# Patient Record
Sex: FEMALE | Race: WHITE | NOT HISPANIC OR LATINO | Employment: PART TIME
[De-identification: names, ages, dates, MRNs, and addresses within clinical notes are randomized per-mention and may not be internally consistent; named-entity substitution may affect disease eponyms.]

---

## 2017-06-10 ENCOUNTER — HEALTH MAINTENANCE LETTER (OUTPATIENT)
Age: 27
End: 2017-06-10

## 2018-05-11 ENCOUNTER — TRANSFERRED RECORDS (OUTPATIENT)
Dept: HEALTH INFORMATION MANAGEMENT | Facility: CLINIC | Age: 28
End: 2018-05-11

## 2018-06-16 ENCOUNTER — HEALTH MAINTENANCE LETTER (OUTPATIENT)
Age: 28
End: 2018-06-16

## 2018-06-21 ENCOUNTER — OFFICE VISIT (OUTPATIENT)
Dept: OBGYN | Facility: CLINIC | Age: 28
End: 2018-06-21
Payer: COMMERCIAL

## 2018-06-21 VITALS
WEIGHT: 174.3 LBS | OXYGEN SATURATION: 98 % | DIASTOLIC BLOOD PRESSURE: 88 MMHG | BODY MASS INDEX: 29.76 KG/M2 | SYSTOLIC BLOOD PRESSURE: 142 MMHG | HEART RATE: 90 BPM | HEIGHT: 64 IN

## 2018-06-21 DIAGNOSIS — D06.9 SEVERE DYSPLASIA OF CERVIX (CIN III): Primary | ICD-10-CM

## 2018-06-21 DIAGNOSIS — Z87.51 HISTORY OF PRETERM DELIVERY: ICD-10-CM

## 2018-06-21 PROCEDURE — 99203 OFFICE O/P NEW LOW 30 MIN: CPT | Performed by: OBSTETRICS & GYNECOLOGY

## 2018-06-21 NOTE — MR AVS SNAPSHOT
After Visit Summary   6/21/2018    Satinder Avalos    MRN: [id number]           Patient Information     Date Of Birth          1990        Visit Information        Provider Department      6/21/2018 1:00 PM Elle Obregon DO [facility]        Care Instructions                                                         If you have any questions regarding your visit, Please contact your care team.    [facility] CLINIC HOURS TELEPHONE NUMBER   Elle Obregon DO.    CARL Perez -    CARLOS Lockhart       [facility], [facility]  8:30a.m-5:00 p.m   [facility]  [address] [place] [address]  [phone] ask for [facility]    Imaging [phone]       Urgent Care locations:    [facility] Saturday and Sunday   9 am - 5 pm    Monday-Friday   12 pm - 8 pm  Saturday and Sunday   9 am - 5 pm   [phone]     [facility] [place] Labor and Delivery:  [phone]    If you need a medication refill, please contact your pharmacy. Please allow 3 business days for your refill to be completed.  As always, Thank you for trusting us with your healthcare needs!                Follow-ups after your visit        Who to contact     If you have questions or need follow up information about today's clinic visit or your schedule please contact [facility] directly at [phone].  Normal or non-critical lab and imaging results will be communicated to you by MyChart, letter or phone within 4 business days after the clinic has received the results. If you do not hear from us within 7 days, please contact the clinic through MyChart or phone. If you have a critical or abnormal lab result, we will notify you by phone as soon as possible.  Submit refill requests through [employer] or call your pharmacy and they will forward the refill request  "to us. Please allow 3 business days for your refill to be completed.          Additional Information About Your Visit        MyChart Information     [employer] gives you secure access to your electronic health record. If you see a primary care provider, you can also send messages to your care team and make appointments. If you have questions, please call your primary care clinic.  If you do not have a primary care provider, please call [phone] and they will assist you.        Care EveryWhere ID     This is your Care EveryWhere ID. This could be used by other organizations to access your [place] medical records  [id number]        Your Vitals Were     Pulse Height Last Period Pulse Oximetry BMI (Body Mass Index)       90 5' 4.17\" (1.63 m) 06/14/2018 98% 29.76 kg/m2        Blood Pressure from Last 3 Encounters:   06/21/18 142/88   04/23/13 118/62   04/16/13 118/72    Weight from Last 3 Encounters:   06/21/18 174 lb 4.8 oz (79.1 kg)   04/23/13 165 lb (74.8 kg)   04/16/13 163 lb (73.9 kg)              Today, you had the following     No orders found for display       Primary Care Provider Office Phone # Fax #    Shama Antoine [phone]       [facility] [address]        Equal Access to Services     [facility]: Hadii aad ku hadasho Soomaali, waaxda luqadaha, qaybta kaalmada adeegyada, waxay alexis haymena silva. So [facility] [phone].    ATENCIÓN: Si habla español, tiene a baez disposición servicios gratuitos de asistencia lingüística. Llame al [phone].    We comply with applicable federal civil rights laws and [place] laws. We do not discriminate on the basis of race, color, national origin, age, disability, sex, sexual orientation, or gender identity.            Thank you!     Thank you for choosing [facility]  [place] your [place]. Our goal is always to provide you with excellent care. Hearing back from our patients is one way we can " continue to improve our services. Please take a few minutes to complete the written survey that you may receive in the mail after your visit with us. Thank you!             Your Updated Medication List - Protect others around you: Learn how to safely use, store and throw away your medicines at www.disposemymeds.org.          This list is accurate as of 6/21/18  1:10 PM.  Always use your most recent med list.                   Brand Name Dispense Instructions for use Diagnosis    boric acid topical Powd powder      500 mg vaginal capsules,  1 per vagina daily for 7 days every month        clindamycin 300 MG capsule    CLEOCIN    21 capsule    Take 1 capsule by mouth 3 times daily.    Bacterial vaginal infection       norelgestromin-ethinyl estradiol 150-35 MCG/24HR patch    ORTHO EVRA    9 patch    Place 1 patch onto the skin once a week. For 9 weeks, then have menses    Contraception

## 2018-06-21 NOTE — PATIENT INSTRUCTIONS
If you have any questions regarding your visit, Please contact your care team.    [facility] CLINIC HOURS TELEPHONE NUMBER   Elle Obregon DO.    CARL Perez -    CARLOS Lockhart       Monday, Wednesday, Thursday and Friday, [place]  8:30a.m-5:00 p.m   [facility]  [address] [place] [address]  [phone] ask for [facility]    Imaging [phone]       Urgent Care locations:    [facility] Saturday and Sunday   9 am - 5 pm    Monday-Friday   12 pm - 8 pm  Saturday and Sunday   9 am - 5 pm   [phone]     [facility] Labor and Delivery:  [phone]    If you need a medication refill, please contact your pharmacy. Please allow 3 business days for your refill to be completed.  As always, Thank you for trusting us with your healthcare needs!

## 2018-06-22 NOTE — PROGRESS NOTES
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  She states that Dr. Payam Joel at [facility] performed colposcopy on 18 and her pathology report demonstrated ELICEO 3 of the ECC.  However, I am not able to access his exam since [facility] is not available on \"Care Everywhere.\"  Her pap on 18 showed ASCUS changes and a high grade lesion could not be ruled out.  Her HPV test on 3/7/13 was + for high risk subtypes.  She has used the Ortho Evra patch 9 years ago for contraception but currently uses condoms.  She is s/p  delivery at 25 weeks gestation so would like to discuss the possible need for cerclage in future pregnancies.    /88 (Patient Position: Sitting, Cuff Size: Adult Regular)  Pulse 90  Ht 5' 4.17\" (1.63 m)  Wt 174 lb 4.8 oz (79.1 kg)  LMP 2018  SpO2 98%  BMI 29.76 kg/m2  ROS:  10 systems were reviewed and the positives were listed under problems.  A PE was not performed today.  Assessment - ELICEO 3 of the ECC, hx of  delivery at 25 weeks gestation of unknown etiology - possible incompetent cervix  Plan - We discussed the recommendation of a LEEP given her recent diagnosis of a high grade lesion/ELICEO 3 of the endocervix.  However, she is very nervous that this could further damage her cervix and increase her risk of a subsequent  delivery since she would like more children.  Will refer her to [facility] to discuss these concerns.  I would like a copy of her medical records from [facility] or a repeat colposcopy could be performed since there is no mention of the appearance of her ectocervix.  This was a 30-minute visit and over 50% of the time was spent in direct pt consultation.    "

## 2018-06-26 ENCOUNTER — TELEPHONE (OUTPATIENT)
Dept: OBGYN | Facility: CLINIC | Age: 28
End: 2018-06-26

## 2018-06-26 ENCOUNTER — MYC MEDICAL ADVICE (OUTPATIENT)
Dept: OBGYN | Facility: CLINIC | Age: 28
End: 2018-06-26

## 2018-06-26 NOTE — TELEPHONE ENCOUNTER
Reason for call:  Other   Patient called regarding (reason for call): Referral  Additional comments: Patient needs perinatology referral faxed over to clinic again    Phone number to reach patient:  Home number on file [phone] (home)    Best Time:  anytime    Can we leave a detailed message on this number?  NO

## 2018-06-27 ENCOUNTER — TELEPHONE (OUTPATIENT)
Dept: OBGYN | Facility: CLINIC | Age: 28
End: 2018-06-27

## 2018-06-27 NOTE — TELEPHONE ENCOUNTER
I sent patient a my chart message informing that I did go ahead and re-faxed the referral paperwork.  Ana Stringer, [facility]  June 27, 2018 9:17 AM

## 2018-07-05 ENCOUNTER — MYC MEDICAL ADVICE (OUTPATIENT)
Dept: OBGYN | Facility: CLINIC | Age: 28
End: 2018-07-05

## 2018-07-31 ENCOUNTER — TELEPHONE (OUTPATIENT)
Dept: MATERNAL FETAL MEDICINE | Facility: CLINIC | Age: 28
End: 2018-07-31

## 2018-07-31 ENCOUNTER — TELEPHONE (OUTPATIENT)
Dept: OBGYN | Facility: CLINIC | Age: 28
End: 2018-07-31

## 2018-07-31 NOTE — TELEPHONE ENCOUNTER
I called this pt and discussed her situation after meeting with Dr. Puga.  The pt will give this more thought and then call me back at the end of this week with her decision.

## 2018-07-31 NOTE — TELEPHONE ENCOUNTER
27 year old   seen for consultation at [facility] office.  Called Dr. Obregon to discuss patient plan of care.  Message left at her office for her to call me back.          Marry Puga MD  , OB/GYN  Maternal-Fetal Medicine  [email or web address]  [phone] (Academic office)  [phone] (Pager)

## 2018-09-04 ENCOUNTER — TELEPHONE (OUTPATIENT)
Dept: OBGYN | Facility: CLINIC | Age: 28
End: 2018-09-04

## 2018-09-04 NOTE — TELEPHONE ENCOUNTER
[facility] Call Center    Phone Message    May a detailed message be left on voicemail: yes    Reason for Call: Other: Patient is calling to schedule a LEEP procedure. Please advise.      Action Taken: Message routed to:  [facility] p [phone]

## 2018-10-01 ENCOUNTER — OFFICE VISIT (OUTPATIENT)
Dept: OBGYN | Facility: CLINIC | Age: 28
End: 2018-10-01
Payer: COMMERCIAL

## 2018-10-01 VITALS
HEART RATE: 80 BPM | DIASTOLIC BLOOD PRESSURE: 86 MMHG | OXYGEN SATURATION: 98 % | SYSTOLIC BLOOD PRESSURE: 123 MMHG | WEIGHT: 180.1 LBS | BODY MASS INDEX: 30.75 KG/M2

## 2018-10-01 DIAGNOSIS — D06.9 SEVERE DYSPLASIA OF CERVIX (CIN III): Primary | ICD-10-CM

## 2018-10-01 PROCEDURE — 99214 OFFICE O/P EST MOD 30 MIN: CPT | Performed by: OBSTETRICS & GYNECOLOGY

## 2018-10-01 NOTE — PATIENT INSTRUCTIONS
If you have any questions regarding your visit, Please contact your care team.    [facility] CLINIC HOURS TELEPHONE NUMBER   Elle Obregon DO.    CARL Perez -    CARLOS Lockhart       Monday, Wednesday, Thursday and Friday, [place]  8:30a.m-5:00 p.m   [facility]  [address] [place] [address]  [phone] ask for [facility]    Imaging [phone]       Urgent Care locations:    [facility] Saturday and Sunday   9 am - 5 pm    Monday-Friday   12 pm - 8 pm  Saturday and Sunday   9 am - 5 pm   [phone]     [facility] Labor and Delivery:  [phone]    If you need a medication refill, please contact your pharmacy. Please allow 3 business days for your refill to be completed.  As always, Thank you for trusting us with your healthcare needs!

## 2018-10-01 NOTE — PROGRESS NOTES
This 27 y/o female, , presents to re-discuss scheduling a LEEP for treatment of ELICEO 3 of the endocervix.  Her colposcopy was performed by Dr. Payam Joel at [facility] on 18 but she failed to return for the recommended LEEP procedure.  After discussing her concerns for not having this done, she was referred to [facility] to discuss.  Dr. Puga advised that she proceed with the LEEP and then decide once pregnant if she needed a cerclage.  She has a hx of a  delivery at 25 weeks gestation of unknown etiology.  She would like to have more children but not at this time.  /86 (Patient Position: Sitting, Cuff Size: Adult Regular)  Pulse 80  Wt 180 lb 1.6 oz (81.7 kg)  LMP 2018 (Exact Date)  SpO2 98%  BMI 30.75 kg/m2  ROS:  10 systems were reviewed and the positives were listed under problems.  A PE was not performed today.  Assessment - ELICEO 3 of the endocervix  Plan - I am still advising a LEEP cone of her cervix and she is now comfortable with this recommendation after discussing her concerns with Dr. Puga from [facility].  Will have Lucy from scheduling call the patient and make these arrangements.  The patient expects her menses in the next couple of days.  Will then have her return for a preop H&P.  This was a 30-minute visit and over 50% of the time was spent in direct pt consultation.

## 2018-10-01 NOTE — MR AVS SNAPSHOT
After Visit Summary   10/1/2018    Satinder Avalos    MRN: [id number]           Patient Information     Date Of Birth          1990        Visit Information        Provider Department      10/1/2018 1:30 PM Elle Obregon DO [facility]        Care Instructions                                                         If you have any questions regarding your visit, Please contact your care team.    [facility] CLINIC HOURS TELEPHONE NUMBER   Elle Obregon DO.    CARL Perez -    CARLOS Lockhart       [facility], [facility]  8:30a.m-5:00 p.m   [facility]  [address] [place] [address]  [phone] ask for [facility]    Imaging [phone]       Urgent Care locations:    [facility] Saturday and Sunday   9 am - 5 pm    Monday-Friday   12 pm - 8 pm  Saturday and Sunday   9 am - 5 pm   [phone]     [facility] Labor and Delivery:  [phone]    If you need a medication refill, please contact your pharmacy. Please allow 3 business days for your refill to be completed.  As always, Thank you for trusting us with your healthcare needs!                Follow-ups after your visit        Who to contact     If you have questions or need follow up information about today's clinic visit or your schedule please contact [facility] directly at [phone].  Normal or non-critical lab and imaging results will be communicated to you by MyChart, letter or phone within 4 business days after the clinic has received the results. If you do not hear from us within 7 days, please contact the clinic through MyChart or phone. If you have a critical or abnormal lab result, we will notify you by phone as soon as possible.  Submit refill requests through [email or web address] or call your pharmacy and they will forward the refill request  to us. Please allow 3 business days for your refill to be completed.          Additional Information About Your Visit        MyChart Information     [employer] gives you secure access to your electronic health record. If you see a primary care provider, you can also send messages to your care team and make appointments. If you have questions, please call your primary care clinic.  If you do not have a primary care provider, please call [phone] and they will assist you.        Care EveryWhere ID     This is your Care EveryWhere ID. This could be used by other organizations to access your [place] medical records  [id number]        Your Vitals Were     Pulse Last Period Pulse Oximetry BMI (Body Mass Index)          80 09/06/2018 (Exact Date) 98% 30.75 kg/m2         Blood Pressure from Last 3 Encounters:   10/01/18 123/86   06/21/18 142/88   04/23/13 118/62    Weight from Last 3 Encounters:   10/01/18 180 lb 1.6 oz (81.7 kg)   06/21/18 174 lb 4.8 oz (79.1 kg)   04/23/13 165 lb (74.8 kg)              Today, you had the following     No orders found for display       Primary Care Provider Office Phone # Fax #    Shama Antoine [phone]       [facility] [address] [place] [address]        Equal Access to Services     DICK SANTOS : Hadii aad ku hadasho Soomaali, waaxda luqadaha, qaybta kaalmada adeegyada, waxay idiin hayaan sandie albert . So [facility] [phone].    ATENCIÓN: Si habla español, tiene a baez disposición servicios gratuitos de asistencia lingüística. Llame al [phone].    We comply with applicable federal civil rights laws and [place] laws. We do not discriminate on the basis of race, color, national origin, age, disability, sex, sexual orientation, or gender identity.            Thank you!     Thank you for choosing [facility]  for your care. Our goal is always to provide you with excellent care. Hearing back from our patients is one way we can continue  to improve our services. Please take a few minutes to complete the written survey that you may receive in the mail after your visit with us. Thank you!             Your Updated Medication List - Protect others around you: Learn how to safely use, store and throw away your medicines at www.disposemymeds.org.          This list is accurate as of 10/1/18  1:40 PM.  Always use your most recent med list.                   Brand Name Dispense Instructions for use Diagnosis    boric acid topical Powd powder      500 mg vaginal capsules,  1 per vagina daily for 7 days every month        clindamycin 300 MG capsule    CLEOCIN    21 capsule    Take 1 capsule by mouth 3 times daily.    Bacterial vaginal infection       norelgestromin-ethinyl estradiol 150-35 MCG/24HR patch    ORTHO EVRA    9 patch    Place 1 patch onto the skin once a week. For 9 weeks, then have menses    Contraception

## 2018-10-03 ENCOUNTER — TELEPHONE (OUTPATIENT)
Dept: OBGYN | Facility: CLINIC | Age: 28
End: 2018-10-03

## 2018-10-03 NOTE — TELEPHONE ENCOUNTER
Surgery Scheduled.    Date of Surgery 11/13/18 Time of Surgery 11:00 am  Procedure: [facility]/Surgical Facility: [facility]  Surgeon: Dr. Obregon  Type of Anesthesia Anticipated:  MAC  Pre-op: 11/07/18 with Dr. Obregon at  OB  2 week post op: Pt will schedule later with Dr. Obregon at  OB  Pre-certification 10/08/18  Consent signed: NA  Hospital Stay NA       [facility] surgery packet mailed to patient's home address.  Patient instructed NPO 12 hours prior to surgery, arrive 1 hours prior to surgery, must have a .  Patient understood and agrees to the plan.      Surgery Pre-Certification    Medical Record Number: [id number]  Satinder Avalos  YOB: 1990   Phone: [phone] (home)   Primary Provider: Shama Antoine    Reason for Admit:  ELICEO 3    Surgeon: Dr. Obregon  Surgical Procedure: LEEP  ICD-9 Coded: D06.9  Date of Surgery: 11/13/18  Consent signed? N/A      Hospital: [facility]  Outpatient    Requestor:  Jacy Benavides     Location:  [facility]    Lucy Zhu [facility]

## 2018-10-03 NOTE — TELEPHONE ENCOUNTER
Associated Diagnoses      Severe dysplasia of cervix (ELICEO III)  - Primary           Order Questions      Question Answer Comment     Procedure name(s) - multi select LEEP      Reason for procedure ELICEO 3 (severe dysplasia) of the endocervical cells      Surgeon: Mindi      Is this a multi surgeon case? No      Laterality N/A      Request for additional equipment Other (see comments) Colposcope     Anesthesia MAC      Initiate Pre-op orders for above procedure: Yes, as ordered in Epic Additional orders noted there also     Location of Case: [facility]      PA Assistant: No      Operating room  requested: Yes      Urgency of Surgery: Routine      Surgeon Procedure Time (incision to closure) in minutes (per procedure as applicable) 60      Note:  Surgical Case Time Needed (in minutes)     Patient Class (for admit prior to surgery, specify number of days in comments): Same day (surgery center outpatient)      H&P To Be Completed By: Surgeon      Where is the note? In [facility] Note       needed? No      Post-Op Appointment 2 weeks      Vendor Needed? No      Spinal Cord Monitoring? No      Patient has Electronic Implant: No

## 2018-10-09 NOTE — TELEPHONE ENCOUNTER
I spoke with Rich at [facility]   CPT 07530 LEE - NO prior authorization required  $3.00 copay for outpatient procedure  REF#[id number]

## 2018-11-07 ENCOUNTER — OFFICE VISIT (OUTPATIENT)
Dept: OBGYN | Facility: CLINIC | Age: 28
End: 2018-11-07
Payer: COMMERCIAL

## 2018-11-07 VITALS
WEIGHT: 175.4 LBS | HEIGHT: 63 IN | TEMPERATURE: 98.2 F | BODY MASS INDEX: 31.08 KG/M2 | OXYGEN SATURATION: 97 % | DIASTOLIC BLOOD PRESSURE: 86 MMHG | HEART RATE: 98 BPM | SYSTOLIC BLOOD PRESSURE: 120 MMHG

## 2018-11-07 DIAGNOSIS — Z01.818 PREOP GENERAL PHYSICAL EXAM: Primary | ICD-10-CM

## 2018-11-07 DIAGNOSIS — Z23 FLU VACCINE NEED: ICD-10-CM

## 2018-11-07 PROCEDURE — 99214 OFFICE O/P EST MOD 30 MIN: CPT | Performed by: OBSTETRICS & GYNECOLOGY

## 2018-11-07 NOTE — PROGRESS NOTES
[address] [address]  [phone]  Dept: [phone]    PRE-OP EVALUATION:  Today's date: 2018    Satinder Avalos (: 1990) presents for pre-operative evaluation assessment as requested by Dr. Obregon.  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  Will also perform colposcopy prior to the LEEP since her ectocervix has not been viewed per myself.    Proposed Surgery/ Procedure: colposcopy followed by a LEEP  Date of Surgery/ Procedure: 18  Time of Surgery/ Procedure: 11:00  [place] [facility]    Primary Physician: Shama Antoine  Type of Anesthesia Anticipated: Local with MAC    Patient has a Health Care Directive or Living Will:  NO    1. NO - Do you have a history of heart attack, stroke, stent, bypass or surgery on an artery in the head, neck, heart or legs?  2. NO - Do you ever have any pain or discomfort in your chest?  3. NO - Do you have a history of  Heart Failure?  4. NO - Are you troubled by shortness of breath when: walking on the level, up a slight hill or at night?  5. NO - Do you currently have a cold, bronchitis or other respiratory infection?  6. NO - Do you have a cough, shortness of breath or wheezing?  7. NO - Do you sometimes get pains in the calves of your legs when you walk?  8. NO - Do you or anyone in your family have previous history of blood clots?  9. NO - Do you or does anyone in your family have a serious bleeding problem such as prolonged bleeding following surgeries or cuts?  10. NO - Have you ever had problems with anemia or been told to take iron pills?  11. NO - Have you had any abnormal blood loss such as black, tarry or bloody stools, or abnormal vaginal bleeding?  12. NO - Have you ever had a blood transfusion?  13. NO - Have you or any of your relatives ever  had problems with anesthesia?  14. NO - Do you have sleep apnea, excessive snoring or daytime drowsiness?  15. NO - Do you have any prosthetic heart valves?  16. NO - Do you have prosthetic joints?  17. NO - Is there any chance that you may be pregnant?      HPI:     HPI related to upcoming procedure: This 27 y/o female, , LMP 18, using condoms for contraception from now until surgery, has been followed at [facility] for follow up of an ECC which was obtained on 18 but Dr. Payam Joel at [facility].  He had advised a LEEP for treatment but the patient declined since she was concerned that this surgery would weaken her cervix and increase her chance of premature delivery.  She already has had a premature delivery at 25 weeks gestation on 2005 so was concerned that her next pregnancy would be complicated.  I referred her to discuss these concerns with Dr. Puga, [facility], and a LEEP was advised followed by a possible cerclage once the patient conceives.  She was advised to start taking a PNV daily po now and avoid secondhand smoke exposure from her .      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 Cancelled.  13 colposcopy. bx- HPV changes.  Plan-- repeat pap at 6 and 12 months. (due 10/16/13)   Per 10/17/13 my chart, OK to change recommendation per new ASCCP guidelines to pap/HPV due 1 year from colp.  May be done here or at [facility] if patient does not have insurance at that time.         Drug allergy 2012     Priority: Medium     metronidazole         Bacterial vaginosis 2011     Priority: Medium     recurrent       CARDIOVASCULAR SCREENING; LDL GOAL LESS THAN 160 2010     Priority:  "Medium     Chronic left SI joint pain 03/09/2010     Priority: Medium      Past Medical History:   Diagnosis Date     ASCUS with positive high risk HPV 3/7/13    + HR 33/45 and 82     H/O colposcopy with cervical biopsy 4/16/13    HPV changes     PID (acute pelvic inflammatory disease)     age 10     Past Surgical History:   Procedure Laterality Date     NO HISTORY OF SURGERY       No current outpatient prescriptions on file.     OTC products: None, except as noted above    Allergies   Allergen Reactions     Flagyl [Metronidazole Hcl]      Swollen lips and facial itching      Latex Allergy: NO    Social History   Substance Use Topics     Smoking status: Never Smoker     Smokeless tobacco: Never Used     Alcohol use Yes      Comment: couple drinks every couple of weeks     History   Drug Use     Yes     Special: Marijuana       REVIEW OF SYSTEMS:   PSH:  Extraction of 4 wisdom teeth in 2007  PMH:  Negative    EXAM:   /86  Pulse 98  Temp 98.2  F (36.8  C) (Oral)  Ht 5' 3\" (1.6 m)  Wt 175 lb 6.4 oz (79.6 kg)  LMP 11/04/2018  SpO2 97%  Breastfeeding? No  BMI 31.07 kg/m2  Hrt - RRR without murmur  Lungs - CTAB    DIAGNOSTICS:   None  No results for input(s): HGB, PLT, INR, NA, POTASSIUM, CR, A1C in the last 55647 hours.     IMPRESSION:   Reason for surgery/procedure: ELICEO 3 of the endocervix per + ECC on 5/11/18  Diagnosis/reason for consult: Need for LEEP    The proposed surgical procedure is considered LOW risk.    REVISED CARDIAC RISK INDEX  The patient has the following serious cardiovascular risks for perioperative complications such as (MI, PE, VFib and 3  AV Block):  No serious cardiac risks  INTERPRETATION: 0 risks: Class I (very low risk - 0.4% complication rate)    The patient has the following additional risks for perioperative complications:  No identified additional risks      ICD-10-CM    1. Preop general physical exam Z01.818        RECOMMENDATIONS:     Informed consent has been reviewed and " obtained for the above listed surgical procedure.  A colposcopy will be performed first, followed by the LEEP since I have not performed this test to-date.  She is to use foam and condoms or abstinence between now and next Tuesday's surgery to avoid pregnancy.  If her UPT is + preoperative, then the LEEP surgery will be cancelled.  The patient's  smokes 1/4 ppd around the pt and refuses to avoid exposing her so we discussed the risks of second-hand smoke exposure.  She declines a flu vaccine.    She also consents to a blood transfusion if emergently necessary.  This was a 30-minute visit and over 50% of the time was spent in direct pt consultation.    Signed Electronically by: Elle Obregon DO FACOG, FACS    Copy of this evaluation report is provided to requesting physician.    [facility] Preop Guidelines    Revised Cardiac Risk Index

## 2018-11-07 NOTE — MR AVS SNAPSHOT
After Visit Summary   11/7/2018    Satinder Avalos    MRN: [id number]           Patient Information     Date Of Birth          1990        Visit Information        Provider Department      11/7/2018 4:15 PM Elle Obregon DO [facility]        Today's Diagnoses     Preop general physical exam    -  1    Flu vaccine need          Care Instructions                                                         If you have any questions regarding your visit, Please contact your care team.    [facility] Access Services: [phone]      [facility] CLINIC HOURS TELEPHONE NUMBER   Elle Obregon DO.    CARL Perez -    CRALOS Lockhart       Monday, Wednesday, Thursday and Friday, [place]  8:30a.m-5:00 p.m   [facility]  [address] [place] [address]  [phone] ask for [facility]    Imaging [phone]       Urgent Care locations:    [facility] Saturday and Sunday   9 am - 5 pm    Monday-Friday   12 pm - 8 pm  Saturday and Sunday   9 am - 5 pm   [phone]     [facility] Labor and Delivery:  [phone]    If you need a medication refill, please contact your pharmacy. Please allow 3 business days for your refill to be completed.  As always, Thank you for trusting us with your healthcare needs!          Before Your Surgery      Call your surgeon if there is any change in your health. This includes signs of a cold or flu (such as a sore throat, runny nose, cough, rash or fever).    Do not smoke, drink alcohol or take over the counter medicine (unless your surgeon or primary care doctor tells you to) for the 24 hours before and after surgery.    If you take prescribed drugs: Follow your doctor s orders about which medicines to take and which to stop until after surgery.    Eating and drinking prior to surgery: follow the instructions from your  surgeon    Take a shower or bath the night before surgery. Use the soap your surgeon gave you to gently clean your skin. If you do not have soap from your surgeon, use your regular soap. Do not shave or scrub the surgery site.  Wear clean pajamas and have clean sheets on your bed.           Follow-ups after your visit        Your next 10 appointments already scheduled     Nov 13, 2018   Procedure with Elle Obregon,    [facility] (--)    [address] [place] [address] [phone]              Who to contact     If you have questions or need follow up information about today's clinic visit or your schedule please contact [facility] directly at [phone].  Normal or non-critical lab and imaging results will be communicated to you by [employer], letter or phone within 4 business days after the clinic has received the results. If you do not hear from us within 7 days, please contact the clinic through Ar[facility]iance[facility] or phone. If you have a critical or abnormal lab result, we will notify you by phone as soon as possible.  Submit refill requests through [facility] or call your pharmacy and they will forward the refill request to us. Please allow 3 business days for your refill to be completed.          Additional Information About Your Visit        Arr[employer] Information     [facility] gives you secure access to your electronic health record. If you see a primary care provider, you can also send messages to your care team and make appointments. If you have questions, please call your primary care clinic.  If you do not have a primary care provider, please call [phone] and they will assist you.        Care EveryWhere ID     This is your Care EveryWhere ID. This could be used by other organizations to access your [place] medical records  [id number]        Your Vitals Were     Pulse Temperature Height Last Period Pulse Oximetry Breastfeeding?    98 98.2  F (36.8  C) (Oral)  "5' 3\" (1.6 m) 11/04/2018 97% No    BMI (Body Mass Index)                   31.07 kg/m2            Blood Pressure from Last 3 Encounters:   11/07/18 120/86   10/01/18 123/86   06/21/18 142/88    Weight from Last 3 Encounters:   11/07/18 175 lb 6.4 oz (79.6 kg)   11/06/18 180 lb (81.6 kg)   10/01/18 180 lb 1.6 oz (81.7 kg)              Today, you had the following     No orders found for display         Today's Medication Changes          These changes are accurate as of 11/7/18  4:19 PM.  If you have any questions, ask your nurse or doctor.               Stop taking these medicines if you haven't already. Please contact your care team if you have questions.     boric acid topical Powd powder   Stopped by:  Elle Obregon DO           clindamycin 300 MG capsule   Commonly known as:  CLEOCIN   Stopped by:  Elle Obregon DO           norelgestromin-ethinyl estradiol 150-35 MCG/24HR patch   Commonly known as:  ORTHO EVRA   Stopped by:  Elle Obregon DO                    Primary Care Provider Office Phone # Fax #    Shama Antoine [phone]       [facility] [address] [place], [place] [id number]        Equal Access to Services     [facility] AH: Hadii aleida ku hadasho Soomaali, waaxda luqadaha, qaybta kaalmada adeegyada, geremias silva. So [facility] [phone].    ATENCIÓN: Si habla español, tiene a baez disposición servicios gratuitos de asistencia lingüística. Llame al [phone].    We comply with applicable federal civil rights laws and [place] laws. We do not discriminate on the basis of race, color, national origin, age, disability, sex, sexual orientation, or gender identity.            Thank you!     Thank you for choosing [facility]  for your care. Our goal is always to provide you with excellent care. Hearing back from our patients is one way we can continue to improve our services. Please take a few minutes to complete the written " survey that you may receive in the mail after your visit with us. Thank you!             Your Updated Medication List - Protect others around you: Learn how to safely use, store and throw away your medicines at www.disposemymeds.org.      Notice  As of 11/7/2018  4:19 PM    You have not been prescribed any medications.

## 2018-11-07 NOTE — PATIENT INSTRUCTIONS
If you have any questions regarding your visit, Please contact your care team.    [facility]: [phone]      [facility] CLINIC HOURS TELEPHONE NUMBER   DO. Ana Rodríguez CMA Lisa -    CARLOS Lockhart       Monday, Wednesday, Thursday and Friday, [place]  8:30a.m-5:00 p.m   [facility]  [address] [place] [address]  [phone] ask for [facility]    Imaging [phone]       Urgent Care locations:    [facility] Saturday and Sunday   9 am - 5 pm    Monday-Friday   12 pm - 8 pm  Saturday and Sunday   9 am - 5 pm   [phone]     [facility] Labor and Delivery:  [phone]    If you need a medication refill, please contact your pharmacy. Please allow 3 business days for your refill to be completed.  As always, Thank you for trusting us with your healthcare needs!          Before Your Surgery      Call your surgeon if there is any change in your health. This includes signs of a cold or flu (such as a sore throat, runny nose, cough, rash or fever).    Do not smoke, drink alcohol or take over the counter medicine (unless your surgeon or primary care doctor tells you to) for the 24 hours before and after surgery.    If you take prescribed drugs: Follow your doctor s orders about which medicines to take and which to stop until after surgery.    Eating and drinking prior to surgery: follow the instructions from your surgeon    Take a shower or bath the night before surgery. Use the soap your surgeon gave you to gently clean your skin. If you do not have soap from your surgeon, use your regular soap. Do not shave or scrub the surgery site.  Wear clean pajamas and have clean sheets on your bed.

## 2018-11-12 ENCOUNTER — ANESTHESIA EVENT (OUTPATIENT)
Dept: SURGERY | Facility: AMBULATORY SURGERY CENTER | Age: 28
End: 2018-11-12

## 2018-11-13 ENCOUNTER — ANESTHESIA (OUTPATIENT)
Dept: SURGERY | Facility: AMBULATORY SURGERY CENTER | Age: 28
End: 2018-11-13
Payer: COMMERCIAL

## 2018-11-13 ENCOUNTER — HOSPITAL ENCOUNTER (OUTPATIENT)
Facility: AMBULATORY SURGERY CENTER | Age: 28
Discharge: HOME | End: 2018-11-13
Attending: OBSTETRICS & GYNECOLOGY | Admitting: OBSTETRICS & GYNECOLOGY
Payer: COMMERCIAL

## 2018-11-13 VITALS
BODY MASS INDEX: 33.13 KG/M2 | HEIGHT: 62 IN | TEMPERATURE: 97 F | WEIGHT: 180 LBS | DIASTOLIC BLOOD PRESSURE: 62 MMHG | OXYGEN SATURATION: 100 % | RESPIRATION RATE: 16 BRPM | HEART RATE: 59 BPM | SYSTOLIC BLOOD PRESSURE: 125 MMHG

## 2018-11-13 DIAGNOSIS — G89.18 POSTOPERATIVE PAIN: Primary | ICD-10-CM

## 2018-11-13 LAB — BETA HCG QUAL IFA URINE: NEGATIVE

## 2018-11-13 PROCEDURE — 57461 CONZ OF CERVIX W/SCOPE LEEP: CPT

## 2018-11-13 PROCEDURE — 88305 TISSUE EXAM BY PATHOLOGIST: CPT | Performed by: OBSTETRICS & GYNECOLOGY

## 2018-11-13 PROCEDURE — 88307 TISSUE EXAM BY PATHOLOGIST: CPT | Performed by: OBSTETRICS & GYNECOLOGY

## 2018-11-13 PROCEDURE — 84703 CHORIONIC GONADOTROPIN ASSAY: CPT | Performed by: OBSTETRICS & GYNECOLOGY

## 2018-11-13 PROCEDURE — 57461 CONZ OF CERVIX W/SCOPE LEEP: CPT | Performed by: OBSTETRICS & GYNECOLOGY

## 2018-11-13 PROCEDURE — G8918 PT W/O PREOP ORDER IV AB PRO: HCPCS

## 2018-11-13 PROCEDURE — G8907 PT DOC NO EVENTS ON DISCHARG: HCPCS

## 2018-11-13 RX ORDER — GABAPENTIN 300 MG/1
300 CAPSULE ORAL ONCE
Status: COMPLETED | OUTPATIENT
Start: 2018-11-13 | End: 2018-11-13

## 2018-11-13 RX ORDER — SODIUM CHLORIDE, SODIUM LACTATE, POTASSIUM CHLORIDE, CALCIUM CHLORIDE 600; 310; 30; 20 MG/100ML; MG/100ML; MG/100ML; MG/100ML
INJECTION, SOLUTION INTRAVENOUS CONTINUOUS
Status: DISCONTINUED | OUTPATIENT
Start: 2018-11-13 | End: 2018-11-14 | Stop reason: HOSPADM

## 2018-11-13 RX ORDER — OXYCODONE HYDROCHLORIDE 5 MG/1
5 TABLET ORAL EVERY 4 HOURS PRN
Status: DISCONTINUED | OUTPATIENT
Start: 2018-11-13 | End: 2018-11-14 | Stop reason: HOSPADM

## 2018-11-13 RX ORDER — ONDANSETRON 2 MG/ML
4 INJECTION INTRAMUSCULAR; INTRAVENOUS EVERY 30 MIN PRN
Status: DISCONTINUED | OUTPATIENT
Start: 2018-11-13 | End: 2018-11-14 | Stop reason: HOSPADM

## 2018-11-13 RX ORDER — ACETAMINOPHEN 325 MG/1
975 TABLET ORAL ONCE
Status: COMPLETED | OUTPATIENT
Start: 2018-11-13 | End: 2018-11-13

## 2018-11-13 RX ORDER — KETOROLAC TROMETHAMINE 30 MG/ML
INJECTION, SOLUTION INTRAMUSCULAR; INTRAVENOUS PRN
Status: DISCONTINUED | OUTPATIENT
Start: 2018-11-13 | End: 2018-11-13

## 2018-11-13 RX ORDER — NALOXONE HYDROCHLORIDE 0.4 MG/ML
.1-.4 INJECTION, SOLUTION INTRAMUSCULAR; INTRAVENOUS; SUBCUTANEOUS
Status: DISCONTINUED | OUTPATIENT
Start: 2018-11-13 | End: 2018-11-14 | Stop reason: HOSPADM

## 2018-11-13 RX ORDER — SODIUM CHLORIDE, SODIUM LACTATE, POTASSIUM CHLORIDE, CALCIUM CHLORIDE 600; 310; 30; 20 MG/100ML; MG/100ML; MG/100ML; MG/100ML
500 INJECTION, SOLUTION INTRAVENOUS CONTINUOUS
Status: DISCONTINUED | OUTPATIENT
Start: 2018-11-13 | End: 2018-11-14 | Stop reason: HOSPADM

## 2018-11-13 RX ORDER — LIDOCAINE 40 MG/G
CREAM TOPICAL
Status: DISCONTINUED | OUTPATIENT
Start: 2018-11-13 | End: 2018-11-14 | Stop reason: HOSPADM

## 2018-11-13 RX ORDER — DEXAMETHASONE SODIUM PHOSPHATE 4 MG/ML
INJECTION, SOLUTION INTRA-ARTICULAR; INTRALESIONAL; INTRAMUSCULAR; INTRAVENOUS; SOFT TISSUE PRN
Status: DISCONTINUED | OUTPATIENT
Start: 2018-11-13 | End: 2018-11-13

## 2018-11-13 RX ORDER — ACETIC ACID 3 %
LIQUID (ML) MISCELLANEOUS PRN
Status: DISCONTINUED | OUTPATIENT
Start: 2018-11-13 | End: 2018-11-13 | Stop reason: HOSPADM

## 2018-11-13 RX ORDER — FENTANYL CITRATE 50 UG/ML
25-50 INJECTION, SOLUTION INTRAMUSCULAR; INTRAVENOUS
Status: DISCONTINUED | OUTPATIENT
Start: 2018-11-13 | End: 2018-11-14 | Stop reason: HOSPADM

## 2018-11-13 RX ORDER — FENTANYL CITRATE 50 UG/ML
INJECTION, SOLUTION INTRAMUSCULAR; INTRAVENOUS PRN
Status: DISCONTINUED | OUTPATIENT
Start: 2018-11-13 | End: 2018-11-13

## 2018-11-13 RX ORDER — KETOROLAC TROMETHAMINE 30 MG/ML
30 INJECTION, SOLUTION INTRAMUSCULAR; INTRAVENOUS EVERY 6 HOURS PRN
Status: DISCONTINUED | OUTPATIENT
Start: 2018-11-13 | End: 2018-11-14 | Stop reason: HOSPADM

## 2018-11-13 RX ORDER — LIDOCAINE HYDROCHLORIDE 20 MG/ML
INJECTION, SOLUTION INFILTRATION; PERINEURAL PRN
Status: DISCONTINUED | OUTPATIENT
Start: 2018-11-13 | End: 2018-11-13

## 2018-11-13 RX ORDER — MEPERIDINE HYDROCHLORIDE 25 MG/ML
12.5 INJECTION INTRAMUSCULAR; INTRAVENOUS; SUBCUTANEOUS
Status: DISCONTINUED | OUTPATIENT
Start: 2018-11-13 | End: 2018-11-14 | Stop reason: HOSPADM

## 2018-11-13 RX ORDER — PROPOFOL 10 MG/ML
INJECTION, EMULSION INTRAVENOUS CONTINUOUS PRN
Status: DISCONTINUED | OUTPATIENT
Start: 2018-11-13 | End: 2018-11-13

## 2018-11-13 RX ORDER — IBUPROFEN 600 MG/1
600 TABLET, FILM COATED ORAL EVERY 6 HOURS PRN
Qty: 30 TABLET | Refills: 0 | Status: SHIPPED | OUTPATIENT
Start: 2018-11-13 | End: 2019-03-22

## 2018-11-13 RX ORDER — ONDANSETRON 4 MG/1
4 TABLET, ORALLY DISINTEGRATING ORAL EVERY 30 MIN PRN
Status: DISCONTINUED | OUTPATIENT
Start: 2018-11-13 | End: 2018-11-14 | Stop reason: HOSPADM

## 2018-11-13 RX ORDER — ONDANSETRON 2 MG/ML
INJECTION INTRAMUSCULAR; INTRAVENOUS PRN
Status: DISCONTINUED | OUTPATIENT
Start: 2018-11-13 | End: 2018-11-13

## 2018-11-13 RX ORDER — FERRIC SUBSULFATE 0.21 G/G
LIQUID TOPICAL PRN
Status: DISCONTINUED | OUTPATIENT
Start: 2018-11-13 | End: 2018-11-13 | Stop reason: HOSPADM

## 2018-11-13 RX ORDER — KETOROLAC TROMETHAMINE 30 MG/ML
30 INJECTION, SOLUTION INTRAMUSCULAR; INTRAVENOUS ONCE
Status: COMPLETED | OUTPATIENT
Start: 2018-11-13 | End: 2018-11-13

## 2018-11-13 RX ADMIN — SODIUM CHLORIDE, SODIUM LACTATE, POTASSIUM CHLORIDE, CALCIUM CHLORIDE 500 ML: 600; 310; 30; 20 INJECTION, SOLUTION INTRAVENOUS at 10:38

## 2018-11-13 RX ADMIN — LIDOCAINE HYDROCHLORIDE 40 MG: 20 INJECTION, SOLUTION INFILTRATION; PERINEURAL at 11:04

## 2018-11-13 RX ADMIN — KETOROLAC TROMETHAMINE 30 MG: 30 INJECTION, SOLUTION INTRAMUSCULAR; INTRAVENOUS at 10:38

## 2018-11-13 RX ADMIN — FENTANYL CITRATE 50 MCG: 50 INJECTION, SOLUTION INTRAMUSCULAR; INTRAVENOUS at 11:04

## 2018-11-13 RX ADMIN — FENTANYL CITRATE 50 MCG: 50 INJECTION, SOLUTION INTRAMUSCULAR; INTRAVENOUS at 10:58

## 2018-11-13 RX ADMIN — KETOROLAC TROMETHAMINE 30 MG: 30 INJECTION, SOLUTION INTRAMUSCULAR; INTRAVENOUS at 11:33

## 2018-11-13 RX ADMIN — OXYCODONE HYDROCHLORIDE 5 MG: 5 TABLET ORAL at 12:04

## 2018-11-13 RX ADMIN — DEXAMETHASONE SODIUM PHOSPHATE 4 MG: 4 INJECTION, SOLUTION INTRA-ARTICULAR; INTRALESIONAL; INTRAMUSCULAR; INTRAVENOUS; SOFT TISSUE at 11:23

## 2018-11-13 RX ADMIN — PROPOFOL 150 MCG/KG/MIN: 10 INJECTION, EMULSION INTRAVENOUS at 11:04

## 2018-11-13 RX ADMIN — GABAPENTIN 300 MG: 300 CAPSULE ORAL at 10:32

## 2018-11-13 RX ADMIN — ONDANSETRON 4 MG: 2 INJECTION INTRAMUSCULAR; INTRAVENOUS at 11:23

## 2018-11-13 RX ADMIN — ACETAMINOPHEN 975 MG: 325 TABLET ORAL at 10:32

## 2018-11-13 NOTE — ANESTHESIA CARE TRANSFER NOTE
Patient: Satinder Avalos    Procedure(s):  EUA, COLPOSCOPY, LEEP    Diagnosis: ELICEO 3 of the endocervical cells  Diagnosis Additional Information: No value filed.    Anesthesia Type:   MAC     Note:  Airway :Face Mask  Patient transferred to:Phase II  Handoff Report: Identifed the Patient, Identified the Reponsible Provider, Reviewed the pertinent medical history, Discussed the surgical course, Reviewed Intra-OP anesthesia mangement and issues during anesthesia, Set expectations for post-procedure period and Allowed opportunity for questions and acknowledgement of understanding      Vitals: (Last set prior to Anesthesia Care Transfer)    CRNA VITALS  11/13/2018 1112 - 11/13/2018 1147      11/13/2018             Pulse: 77    SpO2: 94 %                Electronically Signed By: MAGDALENA Soares CRNA  November 13, 2018  11:47 AM

## 2018-11-13 NOTE — DISCHARGE SUMMARY
Physician Discharge Summary     Patient ID:  Satinder Avalos  [id number]  28 year old  1990    Admit date: 11/13/2018    Discharge date and time: 11/13/2018     Admitting Physician: Elle Obregon DO     Discharge Physician: Same    Admission Diagnoses: ELICEO 3 of the endocervical cells    Discharge Diagnoses: Same    Admission Condition: good    Discharged Condition: good    Indication for Admission: Not applicable    Hospital Course: Not applicable    Consults: none    Significant Diagnostic Studies: none    Treatments: surgery: Exam under anesthesia, colposcopy, LEEP, and endocervical curettings    Discharge Exam: Normal postop exam    Disposition: home    Patient Instructions:   Patient's Medications   New Prescriptions    IBUPROFEN (ADVIL/MOTRIN) 600 MG TABLET    Take 1 tablet (600 mg) by mouth every 6 hours as needed for moderate pain   Previous Medications    No medications on file   Modified Medications    No medications on file   Discontinued Medications    No medications on file     Activity: Nothing per vagina x 2 weeks until checked in the clinic including no intercourse, douching, or tampon use.  Also, no swimming or tub baths but can shower at any time.  Diet: regular diet  Wound Care: keep wound clean and dry    Follow-up with Dr. Obregon in 2 weeks or earlier prn.    Signed:  Elle Obregon DO  FACOG, FACS  11/13/2018  11:39 AM

## 2018-11-13 NOTE — IP AVS SNAPSHOT
[facility]    [address] [place] [address]    Phone:  [phone]                                       After Visit Summary   11/13/2018    Satinder Avalos    MRN: [id number]           After Visit Summary Signature Page     I have received my discharge instructions, and my questions have been answered. I have discussed any challenges I see with this plan with the nurse or doctor.    ..........................................................................................................................................  Patient/Patient Representative Signature      ..........................................................................................................................................  Patient Representative Print Name and Relationship to Patient    ..................................................               ................................................  Date                                   Time    ..........................................................................................................................................  Reviewed by Signature/Title    ...................................................              ..............................................  Date                                               Time          22EPIC Rev 08/18

## 2018-11-13 NOTE — ANESTHESIA PREPROCEDURE EVALUATION
Anesthesia Pre-Procedure Evaluation    Patient: Satinder Avalos   MRN:     [id number] Gender:   female   Age:    28 year old :      1990        Preoperative Diagnosis: ELICEO 3 of the endocervical cells   Procedure(s):  LEEP     Past Medical History:   Diagnosis Date     ASCUS with positive high risk HPV 3/7/13    + HR 33/45 and 82     H/O colposcopy with cervical biopsy 13    HPV changes     PID (acute pelvic inflammatory disease)     age 10      Past Surgical History:   Procedure Laterality Date     NO HISTORY OF SURGERY            Anesthesia Evaluation     .             ROS/MED HX    ENT/Pulmonary:  - neg pulmonary ROS     Neurologic:  - neg neurologic ROS     Cardiovascular:  - neg cardiovascular ROS       METS/Exercise Tolerance:     Hematologic:  - neg hematologic  ROS       Musculoskeletal:  - neg musculoskeletal ROS       GI/Hepatic:  - neg GI/hepatic ROS       Renal/Genitourinary:  - ROS Renal section negative       Endo:  - neg endo ROS   (+) Obesity, .      Psychiatric:  - neg psychiatric ROS       Infectious Disease:  - neg infectious disease ROS       Malignancy:      - no malignancy   Other:    - neg other ROS                     PHYSICAL EXAM:   Mental Status/Neuro: A/A/O   Airway: Facies: Feasible  Mallampati: II  Mouth/Opening: Full  TM distance: > 6 cm  Neck ROM: Full   Respiratory: Auscultation: CTAB     Resp. Rate: Normal     Resp. Effort: Normal      CV: Rhythm: Regular  Rate: Age appropriate  Heart: Normal Sounds   Comments:      Dental: Normal                  Lab Results   Component Value Date    WBC 5.3 2009    HGB 15.1 2009    HCT 43.9 2009     2009     2009    POTASSIUM 3.8 2009    CHLORIDE 104 2009    CO2 27 2009    BUN 6 2009    CR 0.60 2009    GLC 90 2013    SHANNON 9.3 2009    MAG 7.5 (HH) 2005    ALBUMIN 4.2 2009    PROTTOTAL 8.4 2009    ALT 27 2009    AST 36  "06/14/2009    ALKPHOS 87 06/14/2009    BILITOTAL 0.5 06/14/2009    INR 0.94 05/05/2005    FIBR 738 (H) 05/05/2005    TSH 1.43 03/07/2013    HCG Negative 04/16/2013    HCGS Negative 11/10/2007       Preop Vitals  BP Readings from Last 3 Encounters:   11/13/18 (!) 133/92   11/07/18 120/86   10/01/18 123/86    Pulse Readings from Last 3 Encounters:   11/13/18 95   11/07/18 98   10/01/18 80      Resp Readings from Last 3 Encounters:   11/13/18 16   04/23/13 20   04/16/13 16    SpO2 Readings from Last 3 Encounters:   11/13/18 98%   11/07/18 97%   10/01/18 98%      Temp Readings from Last 1 Encounters:   11/13/18 36.8  C (98.2  F) (Tympanic)    Ht Readings from Last 1 Encounters:   11/06/18 1.575 m (5' 2\")      Wt Readings from Last 1 Encounters:   11/06/18 81.6 kg (180 lb)    Estimated body mass index is 32.92 kg/(m^2) as calculated from the following:    Height as of this encounter: 1.575 m (5' 2\").    Weight as of this encounter: 81.6 kg (180 lb).     LDA:  Peripheral IV 11/13/18 Left  (Active)   Site Assessment WDL 11/13/2018 10:31 AM   Line Status Infusing 11/13/2018 10:31 AM   Phlebitis Scale 0-->no symptoms 11/13/2018 10:31 AM   Dressing Intervention New dressing  11/13/2018 10:31 AM   Number of days:0            Assessment:   ASA SCORE: 2    NPO Status: > 6 hours since completed Solid Foods   Documentation: H&P complete; Preop Testing complete; Consents complete   Proceeding: Proceed without further delay  Tobacco Use:  NO Active use of Tobacco/UNKNOWN Tobacco use status     Plan:   Anes. Type:  MAC   Pre-Induction: Midazolam IV   Induction:  IV (Standard)   Airway: Native Airway   Access/Monitoring: PIV   Maintenance: Propofol; IV   Emergence: Procedure Site   Logistics: Same Day Surgery     Postop Pain/Sedation Strategy:  Standard-Options: Opioids PRN     PONV Management:  Adult Risk Factors: Female, Non-Smoker, Postop Opioids  Prevention: Propofol Infusion; Ondansetron     CONSENT: Direct conversation   Plan " and risks discussed with: Patient   Blood Products: Consent Deferred (Minimal Blood Loss)                         Ravi Conn MD

## 2018-11-13 NOTE — IP AVS SNAPSHOT
MRN:[id number]                      After Visit Summary   11/13/2018    Satinder Avalos    MRN: [id number]           Thank you!     Thank you for choosing [facility] for your care. Our goal is always to provide you with excellent care. Hearing back from our patients is one way we can continue to improve our services. Please take a few minutes to complete the written survey that you may receive in the mail after you visit with us. Thank you!        Patient Information     Date Of Birth          1990        About your hospital stay     You were admitted on:  November 13, 2018 You last received care in the:  [facility]    You were discharged on:  November 13, 2018       Who to Call     For medical emergencies, please call 911.  For non-urgent questions about your medical care, please call your primary care provider or clinic, [phone]  For questions related to your surgery, please call your surgery clinic        Attending Provider     Provider Elle Schwartz,  OB/Gyn       Primary Care Provider Office Phone # Fax #    Shama Antoine [phone]      Further instructions from your care team       [facility]  Same-Day Surgery   Adult Discharge Orders & Instructions   For 24 hours after surgery  1. Get plenty of rest.  A responsible adult must stay with you for at least 24 hours after you leave the hospital.   2. Do not drive or use heavy equipment.  If you have weakness or tingling, don't drive or use heavy equipment until this feeling goes away.  3. Do not drink alcohol.  4. Avoid strenuous or risky activities.  Ask for help when climbing stairs.   5. You may feel lightheaded.  IF so, sit for a few minutes before standing.  Have someone help you get up.   6. If you have nausea (feel sick to your stomach): Drink only clear liquids such as apple juice, ginger ale, broth or 7-Up.  Rest may also help.  Be  "sure to drink enough fluids.  Move to a regular diet as you feel able.  7. You may have a slight fever. Call the doctor if your fever is over 100 F (37.7 C) (taken under the tongue) or lasts longer than 24 hours.  8. You may have a dry mouth, a sore throat, muscle aches or trouble sleeping.  These should go away after 24 hours.  9. Do not make important or legal decisions.        10.  Nothing per vagina x 2 weeks including no intercourse, douching, tampons, swimming, or tub baths.  However, you may shower at any time.         11.  Go to the emergency room if you experience heavy vaginal bleeding which is defined as soaking a maxi pad every hour or less.    Call your doctor for any of the followin.  Signs of infection (fever, growing tenderness at the surgery site, a large amount of drainage or bleeding, severe pain, foul-smelling drainage, redness, swelling).    2. It has been over 8 to 10 hours since surgery and you are still not able to urinate (pass water).    3.  Headache for over 24 hours.    Tylenol given 975 mg at 1030        Pending Results     No orders found from 2018 to 2018.            Admission Information     Date & Time Provider Department Dept. Phone    2018 Elle Obregon, [facility] [phone]      Your Vitals Were     Blood Pressure Pulse Temperature Respirations Height Weight    129/72 77 96.6  F (35.9  C) (Tympanic) 16 1.575 m (5' 2\") 81.6 kg (180 lb)    Last Period Pulse Oximetry BMI (Body Mass Index)             2018 100% 32.92 kg/m2         MyChart Information     [employer] gives you secure access to your electronic health record. If you see a primary care provider, you can also send messages to your care team and make appointments. If you have questions, please call your primary care clinic.  If you do not have a primary care provider, please call [phone] and they will assist you.        Care EveryWhere ID     This is your Care " EveryWhere ID. This could be used by other organizations to access your [facility] medical records  [id number]        Equal Access to Services     DICK SANTOS : Hattie Lewis, wajeannieda lumaileadaha, qalouieta kainada sandiejolantalea, waxdilip alexis roxannenelson briceñoandres isakana m roosevelt silva. So [facility] [phone].    ATENCIÓN: Si habla español, tiene a baez disposición servicios gratuitos de asistencia lingüística. Llame al [phone].    We comply with applicable federal civil rights laws and [place] laws. We do not discriminate on the basis of race, color, national origin, age, disability, sex, sexual orientation, or gender identity.               Review of your medicines      START taking        Dose / Directions    ibuprofen 600 MG tablet   Commonly known as:  ADVIL/MOTRIN   Used for:  Postoperative pain        Dose:  600 mg   Take 1 tablet (600 mg) by mouth every 6 hours as needed for moderate pain   Quantity:  30 tablet   Refills:  0            Where to get your medicines      Some of these will need a paper prescription and others can be bought over the counter. Ask your nurse if you have questions.     Bring a paper prescription for each of these medications     ibuprofen 600 MG tablet                Protect others around you: Learn how to safely use, store and throw away your medicines at www.disposemymeds.org.             Medication List: This is a list of all your medications and when to take them. Check marks below indicate your daily home schedule. Keep this list as a reference.      Medications           Morning Afternoon Evening Bedtime As Needed    ibuprofen 600 MG tablet   Commonly known as:  ADVIL/MOTRIN   Take 1 tablet (600 mg) by mouth every 6 hours as needed for moderate pain

## 2018-11-13 NOTE — DISCHARGE INSTRUCTIONS
[facility]  Same-Day Surgery   Adult Discharge Orders & Instructions   For 24 hours after surgery  1. Get plenty of rest.  A responsible adult must stay with you for at least 24 hours after you leave the hospital.   2. Do not drive or use heavy equipment.  If you have weakness or tingling, don't drive or use heavy equipment until this feeling goes away.  3. Do not drink alcohol.  4. Avoid strenuous or risky activities.  Ask for help when climbing stairs.   5. You may feel lightheaded.  IF so, sit for a few minutes before standing.  Have someone help you get up.   6. If you have nausea (feel sick to your stomach): Drink only clear liquids such as apple juice, ginger ale, broth or 7-Up.  Rest may also help.  Be sure to drink enough fluids.  Move to a regular diet as you feel able.  7. You may have a slight fever. Call the doctor if your fever is over 100 F (37.7 C) (taken under the tongue) or lasts longer than 24 hours.  8. You may have a dry mouth, a sore throat, muscle aches or trouble sleeping.  These should go away after 24 hours.  9. Do not make important or legal decisions.        10.  Nothing per vagina x 2 weeks including no intercourse, douching, tampons, swimming, or tub baths.  However, you may shower at any time.         11.  Go to the emergency room if you experience heavy vaginal bleeding which is defined as soaking a maxi pad every hour or less.    Call your doctor for any of the followin.  Signs of infection (fever, growing tenderness at the surgery site, a large amount of drainage or bleeding, severe pain, foul-smelling drainage, redness, swelling).    2. It has been over 8 to 10 hours since surgery and you are still not able to urinate (pass water).    3.  Headache for over 24 hours.    Tylenol given 975 mg at 1030

## 2018-11-13 NOTE — OP NOTE
"Preop diagnosis:  ELICEO 3 of the endocervical canal  Postop diagnosis:  Same  Surgery:  Exam under anesthesia, colposcopy exam, LEEP, and endocervical curettings  Surgeon:  Elle Obregon DO  Assist:  [facility] staff  Anesthesia:  MAC  Complications:  None  Pathology:  Endocervical curettings                     LEEP cone opened at the 3 o'clock position  EBL:  5 ccs  Grafts/implants:  None  Drains:  None  Counts:  Correct    This 29 y/o female, , LMP 18, has been followed at [facility] for the c/o an abnormal pathology report of her endocervix which demonstrated ELICEO 3 (severe dysplasia) on 18 per Dr. Joel ([facility]).  A LEEP was advised but she initially declined since she wanted to conceive.  However, after meeting with Dr. Puga ([facility]), surgical treatment was advised prior to pregnancy and she scheduled for the LEEP.  Informed consent was reviewed and obtained for the listed procedures, and for a blood transfusion if emergently necessary.    She was taken to the OR and was provided MAC.  An exam under anesthesia was performed and no adnexal mass was palpated.  Her uterus was noted to be anteverted and parous in size with normal mobility.  She was then prepped and draped in sterile manner and the \"pause for the cause\" identified the correct patient and procedures.  Next, a specially coated bi-valve speculum was placed.  Her cervix was soaked with 3% acetic acid and after several minutes, this fluid was removed. No acetowhite discolored areas were visualized and 7 ccs of a Vasopressin mix was injected paracervically after aspirating each time.  This was a satisfactory colposcopy.  The blue loop was used to perform the LEEP cone using 50 grove of power with the cut mode and the base was ligated using a Derek's scissors.  The specimen was opened at the 3 o'clock location and pinned to a wooden tongue depressor.  Next, endocervical curettings were obtained and submitted.  This " specimen was sent separate from the cone.  Next, the base of the cone was cauterized using the ball electrode at 50 grove of power with the coag mode and excellent hemostasis was noted.  The EBL was 5 ccs and there were no complications.  All instruments were removed and counts were correct.  She was taken to [facility] in excellent condition.

## 2018-11-13 NOTE — H&P
I have reviewed this patient's preop H&P from 11/7/18 and no interval changes are needed.  This morning's UPT is negative and she denies any risk of pregnancy exposure.  Informed consent has been reviewed and obtained for the listed procedures and for a blood transfusion if emergently necessary.

## 2018-11-13 NOTE — ANESTHESIA POSTPROCEDURE EVALUATION
Anesthesia POST Procedure Evaluation    Patient: Satinder Avalos   MRN:     [id number] Gender:   female   Age:    28 year old :      1990        Preoperative Diagnosis: ELICEO 3 of the endocervical cells   Procedure(s):  EUA, COLPOSCOPY, LEEP   Postop Comments: No value filed.       Anesthesia Type:  MAC    Reportable Event: NO     PAIN: Uncomplicated   Sign Out status: Comfortable, Well controlled pain     PONV: No PONV   Sign Out status:  No Nausea or Vomiting     Neuro/Psych: Uneventful perioperative course   Sign Out Status: Preoperative baseline; Age appropriate mentation     Airway/Resp.: Uneventful perioperative course   Sign Out Status: Non labored breathing, age appropriate RR; Resp. Status within EXPECTED Parameters     CV: Uneventful perioperative course   Sign Out status: Appropriate BP and perfusion indices; Appropriate HR/Rhythm     Disposition:   Sign Out in:  PACU  Disposition:  Phase II; Home  Recovery Course: Uneventful  Follow-Up: Not required           Last Anesthesia Record Vitals:  CRNA VITALS  2018 1112 - 2018 1212      2018             Pulse: 77    SpO2: 94 %          Last PACU/Preop Vitals:  Vitals:    18 1144 18 1200 18 1217   BP: 107/73 129/72 125/62   Pulse: 57 77 59   Resp: 16 16 16   Temp: 35.9  C (96.6  F)  36.1  C (97  F)   SpO2: 100% 100% 100%         Electronically Signed By: Ravi Conn MD, 2018, 2:08 PM

## 2018-11-15 LAB — COPATH REPORT: NORMAL

## 2018-11-16 ENCOUNTER — TELEPHONE (OUTPATIENT)
Dept: OBGYN | Facility: CLINIC | Age: 28
End: 2018-11-16

## 2018-11-29 ENCOUNTER — OFFICE VISIT (OUTPATIENT)
Dept: OBGYN | Facility: CLINIC | Age: 28
End: 2018-11-29
Payer: COMMERCIAL

## 2018-11-29 VITALS
WEIGHT: 178.9 LBS | DIASTOLIC BLOOD PRESSURE: 80 MMHG | SYSTOLIC BLOOD PRESSURE: 123 MMHG | HEART RATE: 76 BPM | OXYGEN SATURATION: 98 % | BODY MASS INDEX: 31.69 KG/M2

## 2018-11-29 DIAGNOSIS — Z98.890 POSTOPERATIVE STATE: Primary | ICD-10-CM

## 2018-11-29 PROCEDURE — 99213 OFFICE O/P EST LOW 20 MIN: CPT | Performed by: OBSTETRICS & GYNECOLOGY

## 2018-11-29 NOTE — PATIENT INSTRUCTIONS
If you have any questions regarding your visit, Please contact your care team.    [employer] [facility]: [phone]      [facility] CLINIC HOURS TELEPHONE NUMBER   Elle Obregon DO.    CARL Perez -    CARLOS Lockhart       Monday, Wednesday, Thursday and Friday, [place]  8:30a.m-5:00 p.m   [facility]  [address] [place] [address]  [phone] ask for [facility]    Imaging [phone]       Urgent Care locations:    [facility] Saturday and Sunday   9 am - 5 pm    Monday-Friday   12 pm - 8 pm  Saturday and Sunday   9 am - 5 pm   [phone]     [facility] Labor and Delivery:  [phone]    If you need a medication refill, please contact your pharmacy. Please allow 3 business days for your refill to be completed.  As always, Thank you for trusting us with your healthcare needs!

## 2018-11-29 NOTE — PROGRESS NOTES
This 29 y/o female, , LMP 18, presents for her 2-week postop check.  She underwent a LEEP on 18 and denied any issues postoperatively.  She received the pathology report and has not further questions or concerns.  She is considering trying for pregnancy after a year or so.  In the interim, she plans to use condoms and declines a script for contraception.  /80  Pulse 76  Wt 178 lb 14.4 oz (81.1 kg)  LMP 2018  SpO2 98%  Breastfeeding? No  BMI 31.69 kg/m2  ROS:  10 systems were reviewed and the positives were listed under problems.  A bi-valve speculum was placed and her cervical cone bed was inspected and appears to be healing well without complication.  Her pelvic exam is negative for tenderness or palpable mass.  Assessment - 2-week postop check  Plan - The results of her pathology report were discussed with the patient and all of her questions were addressed.  Since the endo- and ecto-cervical margins were clear, no further treatment is needed at this time.  Also, her ECC was negative.  F/u in 1 year for her next annual exam or earlier prn.  She prefers to use condoms for contraception.  This was a 20-minute visit and over 50% of the time was spent in direct patient consultation.

## 2018-11-29 NOTE — MR AVS SNAPSHOT
After Visit Summary   11/29/2018    Satinder Avalos    MRN: [id number]           Patient Information     Date Of Birth          1990        Visit Information        Provider Department      11/29/2018 10:15 AM Elle Obregon DO [facility]        Care Instructions                                                         If you have any questions regarding your visit, Please contact your care team.    [employer] Access Services: [phone]      [facility] CLINIC HOURS TELEPHONE NUMBER   Elle Obregon DO.    CARL Perez -    CARLOS Lockhart       [facility], Wednesday[facility]  8:30a.m-5:00 p.m   [facility]  [address]  [place], [place] [address] [phone] ask for [facility]    Imaging [phone]       Urgent Care locations:    [facility] Saturday and Sunday   9 am - 5 pm    Monday-Friday   12 pm - 8 pm  Saturday and Sunday   9 am - 5 pm   [phone]     [facility] Labor and Delivery:  [phone]    If you need a medication refill, please contact your pharmacy. Please allow 3 business days for your refill to be completed.  As always, Thank you for trusting us with your healthcare needs!                Follow-ups after your visit        Who to contact     If you have questions or need follow up information about today's clinic visit or your schedule please contact [facility] directly at [phone].  Normal or non-critical lab and imaging results will be communicated to you by MyChart, letter or phone within 4 business days after the clinic has received the results. If you do not hear from us within 7 days, please contact the clinic through [employer] or phone. If you have a critical or abnormal lab result, we will notify you by phone as soon as possible.  Submit refill requests through [employer] or call your  pharmacy and they will forward the refill request to us. Please allow 3 business days for your refill to be completed.          Additional Information About Your Visit        MyChart Information     [employer] gives you secure access to your electronic health record. If you see a primary care provider, you can also send messages to your care team and make appointments. If you have questions, please call your primary care clinic.  If you do not have a primary care provider, please call [phone] and they will assist you.        Care EveryWhere ID     This is your Care EveryWhere ID. This could be used by other organizations to access your [facility] medical records  [id number]        Your Vitals Were     Pulse Last Period Pulse Oximetry Breastfeeding? BMI (Body Mass Index)       76 11/05/2018 98% No 31.69 kg/m2        Blood Pressure from Last 3 Encounters:   11/29/18 123/80   11/13/18 125/62   11/07/18 120/86    Weight from Last 3 Encounters:   11/29/18 178 lb 14.4 oz (81.1 kg)   11/06/18 180 lb (81.6 kg)   11/07/18 175 lb 6.4 oz (79.6 kg)              Today, you had the following     No orders found for display       Primary Care Provider Office Phone # Fax #    Shama Antoine [phone]       [facility] [address] [place] [address]        Equal Access to Services     DICK SANTOS : Hadii aad ku hadasho Soomaali, waaxda luqadaha, qaybta kaalmada adeegyada, geremias silva. So [facility] [phone].    ATENCIÓN: Si habla español, tiene a baez disposición servicios gratuitos de asistencia lingüística. Valerio al [phone].    We comply with applicable federal civil rights laws and [place] laws. We do not discriminate on the basis of race, color, national origin, age, disability, sex, sexual orientation, or gender identity.            Thank you!     Thank you for choosing [facility]  for your care. Our goal is always to provide you with excellent care. Hearing  back from our patients is one way we can continue to improve our services. Please take a few minutes to complete the written survey that you may receive in the mail after your visit with us. Thank you!             Your Updated Medication List - Protect others around you: Learn how to safely use, store and throw away your medicines at www.disposemymeds.org.          This list is accurate as of 11/29/18 10:29 AM.  Always use your most recent med list.                   Brand Name Dispense Instructions for use Diagnosis    ibuprofen 600 MG tablet    ADVIL/MOTRIN    30 tablet    Take 1 tablet (600 mg) by mouth every 6 hours as needed for moderate pain    Postoperative pain

## 2019-01-22 ENCOUNTER — OFFICE VISIT (OUTPATIENT)
Dept: OBGYN | Facility: CLINIC | Age: 29
End: 2019-01-22
Payer: COMMERCIAL

## 2019-01-22 VITALS
BODY MASS INDEX: 31.07 KG/M2 | TEMPERATURE: 99.2 F | SYSTOLIC BLOOD PRESSURE: 118 MMHG | WEIGHT: 175.4 LBS | HEART RATE: 98 BPM | DIASTOLIC BLOOD PRESSURE: 79 MMHG

## 2019-01-22 DIAGNOSIS — N92.6 MISSED MENSES: Primary | ICD-10-CM

## 2019-01-22 DIAGNOSIS — O26.851 SPOTTING AFFECTING PREGNANCY IN FIRST TRIMESTER: ICD-10-CM

## 2019-01-22 LAB — BETA HCG QUAL IFA URINE: POSITIVE

## 2019-01-22 PROCEDURE — 99214 OFFICE O/P EST MOD 30 MIN: CPT | Performed by: OBSTETRICS & GYNECOLOGY

## 2019-01-22 PROCEDURE — 84703 CHORIONIC GONADOTROPIN ASSAY: CPT | Performed by: OBSTETRICS & GYNECOLOGY

## 2019-01-22 SDOH — HEALTH STABILITY: MENTAL HEALTH: HOW OFTEN DO YOU HAVE A DRINK CONTAINING ALCOHOL?: NEVER

## 2019-01-22 NOTE — PROGRESS NOTES
Satinder is a 28 year old  referred here by self for consultation regarding pregnancy confirmation.  LMP of 18. EGA of 6.4 weeks. Has been spotting on and off x 1 week. No cramps..    ROS: Ten point review of systems was reviewed and negative except the above.    Gyne: - abn pap (last pap ), - STD's    Past Medical History:   Diagnosis Date     ASCUS with positive high risk HPV 3/7/13    + HR 33/45 and 82     H/O colposcopy with cervical biopsy 13    HPV changes     PID (acute pelvic inflammatory disease)     age 10     Past Surgical History:   Procedure Laterality Date     CONIZATION LEEP N/A 2018    Procedure: EUA, COLPOSCOPY, LEEP;  Surgeon: Elle Obregon DO;  Location:  OR     NO HISTORY OF SURGERY       Patient Active Problem List   Diagnosis     Chronic left SI joint pain     CARDIOVASCULAR SCREENING; LDL GOAL LESS THAN 160     Bacterial vaginosis     Drug allergy     ASCUS with positive high risk HPV     Elevated BP     Obesity     Flu vaccine need       ALL/Meds: Her medication and allergy histories were reviewed and are documented in their appropriate chart areas.    SH: - tob, - EtOH,     FH: Her family history was reviewed and documented in its appropriate chart area.    PE: /79   Pulse 98   Temp 99.2  F (37.3  C) (Tympanic)   Wt 79.6 kg (175 lb 6.4 oz)   LMP 2018   Breastfeeding? No   BMI 31.07 kg/m    Body mass index is 31.07 kg/m .      General:  WNWD female, NAD  Alert  Oriented x 3  Gait:  Normal  Skin:  Normal skin turgor  HEENT:  NC/AT, EOMI  Abdomen:  Non-tender, non-distended.  Pelvic exam:  Not performed  Extremities:  No clubbing, no cyanosis and no edema.  Results for orders placed or performed in visit on 19   Beta HCG qual IFA urine   Result Value Ref Range    Beta HCG Qual IFA Urine Positive (A) NEG^Negative          A/P    ICD-10-CM    1. Missed menses N92.6 Beta HCG qual IFA urine     US OB < 14 Weeks Single   2. Spotting  affecting pregnancy in first trimester O26.851 Beta HCG qual IFA urine     US OB < 14 Weeks Single     U/S this week.  Prenatal package provided.  25 minutes was spent face to face with the patient today discussing her history, diagnosis, and follow-up plan as noted above.  Over 50% of the visit was spent in counseling and coordination of care.    Total Visit Time: 30 minutes.    CEPHAS AGBEH, MD.

## 2019-01-22 NOTE — PATIENT INSTRUCTIONS
If you have any questions regarding your visit, Please contact your care team.    [employer] [facility]: [phone]      [facility] CLINIC HOURS TELEPHONE NUMBER   Cephas Agbeh, M.D.    CARL Quiroga,     CARLOS Lockhart, CARLOS             Monday-Bert    8:00a.m-4:45 p.m    Tuesday--[place]     8:00a.m-4:45 p.m.    Thursday-Bert    8:00a.m-4:45 p.m.    Friday-[place]    8:00a.m-4:45 p.m    [facility]   [address]   [place] [address] [phone]-Ask for [facility]   Fax [phone]   Rhjhlyh-[phone]     [facility] Labor and Delivery   [address] [place] [address] Dr.   [place], [place] [address] [phone]     [facility]   [address] [facility] [address] [phone]   [phone]    Urgent Care locations:    [facility]  Monday-Friday   5 pm - 9 pm   Saturday and Sunday    9 am - 5 pm      Monday-Friday    11 pm - 9 pm  Saturday and Sunday   9 am - 5 pm    [phone]     If you need a medication refill, please contact your pharmacy. Please allow 3 business days for your refill to be completed.  As always, Thank you for trusting us with your healthcare needs!

## 2019-01-23 ENCOUNTER — ANCILLARY PROCEDURE (OUTPATIENT)
Dept: ULTRASOUND IMAGING | Facility: CLINIC | Age: 29
End: 2019-01-23
Payer: COMMERCIAL

## 2019-01-23 ENCOUNTER — MYC MEDICAL ADVICE (OUTPATIENT)
Dept: OBGYN | Facility: CLINIC | Age: 29
End: 2019-01-23

## 2019-01-23 DIAGNOSIS — O26.851 SPOTTING AFFECTING PREGNANCY IN FIRST TRIMESTER: ICD-10-CM

## 2019-01-23 DIAGNOSIS — N92.6 MISSED MENSES: ICD-10-CM

## 2019-01-23 PROCEDURE — 76817 TRANSVAGINAL US OBSTETRIC: CPT | Performed by: RADIOLOGY

## 2019-01-23 PROCEDURE — 76801 OB US < 14 WKS SINGLE FETUS: CPT | Mod: 59 | Performed by: RADIOLOGY

## 2019-01-24 NOTE — TELEPHONE ENCOUNTER
Elle Obregon DO Mg Ob/Gyn Triage 1 hour ago (1:08 PM)      This patient can take an OTC prenatal vitamin.  She needs to be seen between 8-10 weeks, if possible, due to her high-risk status.

## 2019-02-20 ENCOUNTER — RESULT FOLLOW UP (OUTPATIENT)
Dept: OBGYN | Facility: CLINIC | Age: 29
End: 2019-02-20

## 2019-02-20 ENCOUNTER — TELEPHONE (OUTPATIENT)
Dept: OBGYN | Facility: CLINIC | Age: 29
End: 2019-02-20

## 2019-02-20 ENCOUNTER — PRENATAL OFFICE VISIT (OUTPATIENT)
Dept: OBGYN | Facility: CLINIC | Age: 29
End: 2019-02-20
Payer: COMMERCIAL

## 2019-02-20 ENCOUNTER — ANCILLARY PROCEDURE (OUTPATIENT)
Dept: ULTRASOUND IMAGING | Facility: CLINIC | Age: 29
End: 2019-02-20
Attending: OBSTETRICS & GYNECOLOGY
Payer: COMMERCIAL

## 2019-02-20 ENCOUNTER — MEDICAL CORRESPONDENCE (OUTPATIENT)
Dept: HEALTH INFORMATION MANAGEMENT | Facility: CLINIC | Age: 29
End: 2019-02-20

## 2019-02-20 VITALS
HEART RATE: 74 BPM | SYSTOLIC BLOOD PRESSURE: 111 MMHG | BODY MASS INDEX: 30.62 KG/M2 | HEIGHT: 63 IN | DIASTOLIC BLOOD PRESSURE: 72 MMHG | WEIGHT: 172.8 LBS | TEMPERATURE: 97.6 F | OXYGEN SATURATION: 100 %

## 2019-02-20 DIAGNOSIS — N76.0 BV (BACTERIAL VAGINOSIS): ICD-10-CM

## 2019-02-20 DIAGNOSIS — O21.0 HYPEREMESIS GRAVIDARUM: Primary | ICD-10-CM

## 2019-02-20 DIAGNOSIS — N87.1 DYSPLASIA OF CERVIX, HIGH GRADE CIN 2: ICD-10-CM

## 2019-02-20 DIAGNOSIS — Z98.890 HISTORY OF CERVICAL LEEP BIOPSY AFFECTING CARE OF MOTHER, ANTEPARTUM: ICD-10-CM

## 2019-02-20 DIAGNOSIS — B96.89 BV (BACTERIAL VAGINOSIS): ICD-10-CM

## 2019-02-20 DIAGNOSIS — O09.529 SUPERVISION OF HIGH-RISK PREGNANCY OF ELDERLY MULTIGRAVIDA: Primary | ICD-10-CM

## 2019-02-20 DIAGNOSIS — O21.0 HYPEREMESIS GRAVIDARUM: ICD-10-CM

## 2019-02-20 DIAGNOSIS — O09.91 SUPERVISION OF HIGH RISK PREGNANCY IN FIRST TRIMESTER: ICD-10-CM

## 2019-02-20 DIAGNOSIS — O09.891 HISTORY OF PRETERM DELIVERY, CURRENTLY PREGNANT IN FIRST TRIMESTER: ICD-10-CM

## 2019-02-20 DIAGNOSIS — O34.40 HISTORY OF CERVICAL LEEP BIOPSY AFFECTING CARE OF MOTHER, ANTEPARTUM: ICD-10-CM

## 2019-02-20 DIAGNOSIS — A59.9 INFECTION DUE TO TRICHOMONAS: ICD-10-CM

## 2019-02-20 LAB
ABO + RH BLD: NORMAL
ABO + RH BLD: NORMAL
BASOPHILS # BLD AUTO: 0 10E9/L (ref 0–0.2)
BASOPHILS NFR BLD AUTO: 0.3 %
BLD GP AB SCN SERPL QL: NORMAL
BLOOD BANK CMNT PATIENT-IMP: NORMAL
DIFFERENTIAL METHOD BLD: ABNORMAL
EOSINOPHIL # BLD AUTO: 0.1 10E9/L (ref 0–0.7)
EOSINOPHIL NFR BLD AUTO: 0.7 %
ERYTHROCYTE [DISTWIDTH] IN BLOOD BY AUTOMATED COUNT: 12.3 % (ref 10–15)
HCT VFR BLD AUTO: 38.5 % (ref 35–47)
HGB BLD-MCNC: 13.3 G/DL (ref 11.7–15.7)
IMM GRANULOCYTES # BLD: 0.1 10E9/L (ref 0–0.4)
IMM GRANULOCYTES NFR BLD: 0.4 %
LYMPHOCYTES # BLD AUTO: 2.6 10E9/L (ref 0.8–5.3)
LYMPHOCYTES NFR BLD AUTO: 20.5 %
MCH RBC QN AUTO: 30.8 PG (ref 26.5–33)
MCHC RBC AUTO-ENTMCNC: 34.5 G/DL (ref 31.5–36.5)
MCV RBC AUTO: 89 FL (ref 78–100)
MONOCYTES # BLD AUTO: 0.8 10E9/L (ref 0–1.3)
MONOCYTES NFR BLD AUTO: 6.4 %
NEUTROPHILS # BLD AUTO: 9 10E9/L (ref 1.6–8.3)
NEUTROPHILS NFR BLD AUTO: 71.7 %
PLATELET # BLD AUTO: 327 10E9/L (ref 150–450)
RBC # BLD AUTO: 4.32 10E12/L (ref 3.8–5.2)
SPECIMEN EXP DATE BLD: NORMAL
SPECIMEN SOURCE: ABNORMAL
WBC # BLD AUTO: 12.6 10E9/L (ref 4–11)
WET PREP SPEC: ABNORMAL

## 2019-02-20 PROCEDURE — 86762 RUBELLA ANTIBODY: CPT | Performed by: OBSTETRICS & GYNECOLOGY

## 2019-02-20 PROCEDURE — 86850 RBC ANTIBODY SCREEN: CPT | Performed by: OBSTETRICS & GYNECOLOGY

## 2019-02-20 PROCEDURE — 87086 URINE CULTURE/COLONY COUNT: CPT | Performed by: OBSTETRICS & GYNECOLOGY

## 2019-02-20 PROCEDURE — 87624 HPV HI-RISK TYP POOLED RSLT: CPT | Performed by: OBSTETRICS & GYNECOLOGY

## 2019-02-20 PROCEDURE — 87591 N.GONORRHOEAE DNA AMP PROB: CPT | Performed by: OBSTETRICS & GYNECOLOGY

## 2019-02-20 PROCEDURE — 36415 COLL VENOUS BLD VENIPUNCTURE: CPT | Performed by: OBSTETRICS & GYNECOLOGY

## 2019-02-20 PROCEDURE — 0064U ANTB TP TOTAL&RPR IA QUAL: CPT | Performed by: OBSTETRICS & GYNECOLOGY

## 2019-02-20 PROCEDURE — 87389 HIV-1 AG W/HIV-1&-2 AB AG IA: CPT | Performed by: OBSTETRICS & GYNECOLOGY

## 2019-02-20 PROCEDURE — G0145 SCR C/V CYTO,THINLAYER,RESCR: HCPCS | Performed by: OBSTETRICS & GYNECOLOGY

## 2019-02-20 PROCEDURE — 86901 BLOOD TYPING SEROLOGIC RH(D): CPT | Performed by: OBSTETRICS & GYNECOLOGY

## 2019-02-20 PROCEDURE — 86900 BLOOD TYPING SEROLOGIC ABO: CPT | Performed by: OBSTETRICS & GYNECOLOGY

## 2019-02-20 PROCEDURE — 87491 CHLMYD TRACH DNA AMP PROBE: CPT | Performed by: OBSTETRICS & GYNECOLOGY

## 2019-02-20 PROCEDURE — 87210 SMEAR WET MOUNT SALINE/INK: CPT | Performed by: OBSTETRICS & GYNECOLOGY

## 2019-02-20 PROCEDURE — 87340 HEPATITIS B SURFACE AG IA: CPT | Performed by: OBSTETRICS & GYNECOLOGY

## 2019-02-20 PROCEDURE — 99207 ZZC FIRST OB VISIT: CPT | Performed by: OBSTETRICS & GYNECOLOGY

## 2019-02-20 PROCEDURE — 85025 COMPLETE CBC W/AUTO DIFF WBC: CPT | Performed by: OBSTETRICS & GYNECOLOGY

## 2019-02-20 PROCEDURE — 76801 OB US < 14 WKS SINGLE FETUS: CPT | Performed by: STUDENT IN AN ORGANIZED HEALTH CARE EDUCATION/TRAINING PROGRAM

## 2019-02-20 RX ORDER — VITAMIN A ACETATE, .BETA.-CAROTENE, ASCORBIC ACID, CHOLECALCIFEROL, .ALPHA.-TOCOPHEROL ACETATE, DL-, THIAMINE MONONITRATE, RIBOFLAVIN, NIACINAMIDE, PYRIDOXINE HYDROCHLORIDE, FOLIC ACID, CYANOCOBALAMIN, CALCIUM CARBONATE, FERROUS FUMARATE, ZINC OXIDE, AND CUPRIC OXIDE 2000; 2000; 120; 400; 22; 1.84; 3; 20; 10; 1; 12; 200; 27; 25; 2 [IU]/1; [IU]/1; MG/1; [IU]/1; MG/1; MG/1; MG/1; MG/1; MG/1; MG/1; UG/1; MG/1; MG/1; MG/1; MG/1
1 TABLET ORAL DAILY
COMMUNITY
End: 2021-02-02

## 2019-02-20 RX ORDER — METRONIDAZOLE 500 MG/1
500 TABLET ORAL 2 TIMES DAILY
Qty: 14 TABLET | Refills: 0 | Status: SHIPPED | OUTPATIENT
Start: 2019-02-20 | End: 2019-03-22

## 2019-02-20 RX ORDER — CLINDAMYCIN HCL 300 MG
300 CAPSULE ORAL 2 TIMES DAILY
Qty: 14 CAPSULE | Refills: 0 | Status: SHIPPED | OUTPATIENT
Start: 2019-02-20 | End: 2019-03-22

## 2019-02-20 RX ORDER — ONDANSETRON 4 MG/1
4 TABLET, FILM COATED ORAL EVERY 6 HOURS PRN
Qty: 30 TABLET | Refills: 1 | Status: SHIPPED | OUTPATIENT
Start: 2019-02-20 | End: 2019-03-22

## 2019-02-20 ASSESSMENT — MIFFLIN-ST. JEOR: SCORE: 1470.94

## 2019-02-20 NOTE — PATIENT INSTRUCTIONS
If you have any questions regarding your visit, Please contact your care team.    Women s Health CLINIC HOURS TELEPHONE NUMBER   Elle Obregon DO.    CARL Perez -    CARLOS Lockhart       Monday, Wednesday, [facility] and [facility]  8:30a.m-5:00 p.m   [facility]  [address] [place] [address]  [phone] ask for Women's Clinic    Imaging [phone]       Urgent Care locations:    [facility] Saturday and    9 am - 5 pm    Monday-Friday   12 pm - 8 pm  Saturday and    9 am - 5 pm   [phone]     [facility] Labor and Delivery:  [phone]    If you need a medication refill, please contact your pharmacy. Please allow 3 business days for your refill to be completed.  As always, Thank you for trusting us with your healthcare needs!           BIRTH AND 17-alpha hydroxyprogesterone caproate (17P) TREATMENT    What is  birth?      birth is the delivery of a baby before 37 weeks of gestation.  Approximately 1 in every 12 babies in [place] is born premature (that s approximately 6,000 babies every year)!     birth is often unexpected, but can happen for many reasons. There are some known risk factors, which include:   -pregnancy with twins or triplets   -being  race  -some problems with the uterus or cervix   -some medical problems like high blood pressure   -smoking, drinking, or using illegal drugs while pregnant   -infections like urinary tract infection, bacterial vaginosis and chlamydia while    pregnant  -depression, stress, and anxiety    But the biggest risk factor is having a previous  baby. In fact, women who have one  birth have a 30-50% chance of their next baby coming early too.     What are the risks to a baby that delivers prematurely?     birth is the number one cause of  death worldwide.  This risk increases the earlier the baby is born.  Prematurity can also cause serious long term health problems for babies such as breathing problems, infections, bleeding into the brain, as well as long term developmental problems like cerebral palsy, learning impairments, hearing and vision problems.    How can I prevent  birth in my pregnancy?  Overall, the best thing to prevent  delivery is to stay healthy during your pregnancy, and follow up with your doctor for your regular visits and screening tests.  If you have a history of  birth in one of your past pregnancies, you may benefit from weekly injections of 17P.     What is 17P?  The full name is 17-alpha hydroxyprogesterone caproate. This is a form of your body s natural  pregnancy hormone , progesterone. The brand name of this is Maranda, and it is FDA approved for prevention of  birth.  This medication is given in once weekly injections from week 16-20 through the 36th week of pregnancy. Research shows that women taking 17P can reduce their risk of  birth from 50% to 36%. The treatment has also been shown to reduce the risk of complications after birth, such as bleeding in the brain and need for supplemental oxygen.    It is important to complete the treatment once you start, as the risk of  birth goes up if you stop the injections early.    How can I get started on the Maranda treatment?  First, you should talk with your doctor to find out if this is a good option for you.  It is safe for pregnant women and for the fetus, but if you have some medical problems like uncontrolled blood pressure, breast cancer, or liver disease you should talk about it with your doctor first.  Your clinic will work with you to help determine insurance coverage and preauthorization if needed.  Then you will schedule weekly visits for 17P injections in addition to your routine prenatal visits with your doctor.    Side Effects of  [place]  The most common side effects  reported by [place] users are   injection site reactions (pain [35%], swelling  [17%], pruritus (itching) [6%], nodule [5%]), urticaria (rash) (12%), pruritus (generalized itching (8%), nausea (6%), and diarrhea (2%).

## 2019-02-20 NOTE — LETTER
December 12, 2019      Satinder Leatha Robbie  [address]  [facility] [address]    Dear MsCharleyRobbie,      At [facility], your health and wellness is our primary concern. That is why we are following up on a positive high risk HPV test from 2/20/19. Your provider had recommended that you have a Colposcopy  completed post partum. Our records do not show that this has been done or scheduled.      It is important to complete the follow up that your provider has suggested for you to ensure that there are no worsening changes which may, over time, develop into cancer.      If you have chosen not to do the recommended colposcopy, please contact our office at [phone], ask for women s clinic to schedule an appointment for a repeat PAP smear and HPV test at your earliest convenience.    If you have completed the tests outside of [facility], please have the results forwarded to our office. We will update the chart for your primary Physician to review before your next annual physical.     Thank you for choosing [facility]!    Sincerely,      Your [facility] Care Team/gautam

## 2019-02-20 NOTE — PROGRESS NOTES
Satinder is a 28 year old female, , who is here today for her first OB visit at 10 1/7 weeks gestation and this is a high-risk pregnancy.  She has had a positive home pregnancy test.  Her history is significant for a LEEP on 18 for the treatment of ELICEO 2 and both endo- and ecto-cervical margins were negative.  Will check a diagnostic pap today along with GC and Chlamydia cultures.  She c/o daily nausea so her treatment options were discussed and she would like to try Zofran in the tablet form.  She declines a flu vaccine.  Her OB history is also significant for a hx of  delivery at 25 weeks gestation so will check a 17-OH progesterone level and plan to start on [place] at 16 weeks gestation, if indicated.      ROS: Ten point review of systems was reviewed and negative except the above.    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    SH: Reviewed and documented in the appropriate area of the chart.    FH: Her family history was reviewed and documented in its appropriate chart area.    PE: BP  "111/72   Pulse 74   Temp 97.6  F (36.4  C) (Oral)   Ht 1.597 m (5' 2.87\")   Wt 78.4 kg (172 lb 12.8 oz)   LMP 2018   SpO2 100%   BMI 30.73 kg/m    Body mass index is 30.73 kg/m .    Urine HCG was + on 19  Hrt - RRR without murmur  Lungs - CTAB  Breasts - not examined per patient request  Neck - supple without palpable mass  Skin - multiple tattoos  Abd - soft, nontender, gravid, unable to hear fhts with doppler so a limited OB US was performed at bedside but I was not able to see fetal movement so will check a formal US  Pelvic - normal female genitalia, normal vaginal mucosa except for a frothy white vaginal discharge so a wet prep was obtained and submitted to lab, closed cervix without tenderness with motion, no adnexal mass or tenderness, non tender uterus with size consistent with dates.  A table-side OB US was performed myself but I did not visualize fetal movement so will check with a formal US.  Ext - negative    A/P:  High-risk OB patient due to hx of  delivery at 25 weeks gestation, recent LEEP 3 months ago, and hyperemesis gravidarum with 3-lb weight loss   - I discussed with her nutrition and medication concerns related to pregnancy.  We discussed folic acid supplementation.  We reviewed prenatal care.  She was given the opportunity to ask questions and have them answered.  A script for Zofran was sent to her pharmacy for treatment of hyperemesis gravidarum since she lost 3 lbs since her last visit (OB confirmation visit).  Will check a limited OB US today to verify viability.  I performed a limited table-side OB US today but the fetus did not move.  We discussed checking the cervical length every 2 weeks, due to her history of a LEEP 3 months ago.  She is comfortable with this plan.  We also discussed checking a 17-OH progesterone lab and determine if she is a candidate for [place] injections weekly from 16-37 weeks gestation due to her hx of  delivery at 25 weeks " "gestation.  However, I want to first verify if this pregnancy is viable since fhts and fetal movement were not noted today on exam.  Submit a wet prep and treat if +.  Submit the pap (diagnostic) due to her recent history of a LEEP 3 months ago.    Check routine prenatal lab tests.  She declined a flu vaccine.      30 minutes were spent face to face with the patient today discussing her history, diagnosis, and follow-up plan as noted above.  Over 50% of the visit was spent in counseling and coordination of care.    Total Visit Time: 30 minutes.    Orders Placed This Encounter   Procedures     US OB < 14 Weeks Single     CBC with platelets differential     Hepatitis B surface antigen     HIV Antigen Antibody Combo     Rubella Antibody IgG Quantitative     Treponema Abs w Reflex to RPR and Titer     Pap imaged thin layer screen with HPV - recommended age 30 - 65 years (select HPV order below)     ABO/Rh type and screen     Elle Obregon DO  FACOG, FACS  Evaluation for 17P   Is this current pregnancy a lui pregnancy? Yes  Has this patient experienced a spontaneous,  birth or  rupture of membranes between 20 - 37 weeks of a lui pregnancy? Yes    Both answers are \"Yes\" the patient may be a candidate for \"17P\" Maranda.     Dosage and Administration      Administer intramuscularly at a dose of 250 mg (1 mL) once weekly     Begin treatment between 16 weeks, 0 days and 20 weeks, 6 days of gestation     Continue administration once weekly until week 37 (through 36 weeks, 6 days)  of          gestation or delivery, whichever occurs first     Adverse Reactions  Most common adverse reactions reported in = 2% of subjects and at a higher rate in the  [place] group than in the control group are injection site reactions (pain [35%], swelling  [17%], pruritus [6%], nodule [5%]), urticaria (12%), pruritus (8%), nausea (6%), and  diarrhea (2%). (6.1)}     Assessment:  Satinder is a 28 year old  at " 10w1d with a history of prior spontaneous lui  birth.  Given this history, Satinder is at risk for recurrent  birth in this pregnancy.  I discussed the availability of 17-alpha hydroxyprogesterone caproate (17P), which has been demonstrated to reduce the incidence of recurrent  birth and Satinder may meet criteria for weekly 17P administration in this pregnancy.  Will first check a formal OB US today, though, to verify viability since fhts were not heard with doppler and fetal movement was not visualized per limited OB US.  Patient has no contraindications to 17P.    Informed patient that 17P requires a commitment to weekly injection which should begin before 20+6 weeks and abrupt discontinuation can increase the risk for  birth.  Patient agrees to proceed with weekly 17P injections.  Plan:   17P 250mg IM weekly beginning between 16-20 weeks through 36 weeks gestation  Serial transvaginal ultrasound for cervical length from 16 through 22-23 weeks gestation  Screen for asymptomatic bacteriuria at first prenatal visit and treat with appropriate antibiotics if present  Smoking risk discussed. Non smoking household.

## 2019-02-20 NOTE — LETTER
September 25, 2019      Satinder Leatha Robbie  [address]  [place] [address]    Dear MsScottjuan,      At [facility], your health and wellness is our primary concern. That is why we are following up on a positive high risk HPV test from 2/20/19. Your provider had recommended that you have a Colposcopy  completed post partum. Our records do not show that this has been done.    It is important to complete the follow up that your provider has suggested for you to ensure that there are no worsening changes which may, over time, develop into cancer.      Please contact our office at  [phone] and ask for the women's clinic to schedule an appointment for a Colposcopy (this cannot be scheduled through [facility]) at your earliest convenience. If you have questions or concerns, please call the clinic and we will be happy to assist you.    If you have completed the tests outside of [facility], please have the results forwarded to our office. We will update the chart for your primary Physician to review before your next annual physical.     Thank you for choosing [facility]!    Sincerely,      Your [facility] Care Team/rlm

## 2019-02-20 NOTE — LETTER
December 2, 2019      Satinder Leatha Robbie  [address]  [facility] [address]    Dear MsCharleyRobbie,      At [facility], your health and wellness is our primary concern. That is why we are following up on a positive high risk HPV test from 2/20/19. Your provider had recommended that you have a Colposcopy  completed post partum. Our records do not show that this has been done or scheduled.      It is important to complete the follow up that your provider has suggested for you to ensure that there are no worsening changes which may, over time, develop into cancer.      If you have chosen not to do the recommended colposcopy, please contact our office at [phone], ask for women s clinic to schedule an appointment for a repeat PAP smear and HPV test at your earliest convenience.    If you have completed the tests outside of [facility], please have the results forwarded to our office. We will update the chart for your primary Physician to review before your next annual physical.     Thank you for choosing [facility]!    Sincerely,      Your [facility] Care Team/gautam

## 2019-02-20 NOTE — TELEPHONE ENCOUNTER
I called the patient and explained her STD result - trichomonas, and possible need for desensitization therapy with Flagyl.  Also, will need to refer her to [facility] for this plus her hx of previous PTD at 25 weeks gestation and recent LEEP procedure 3 months ago.  She voiced understanding of this plan.

## 2019-02-21 LAB
BACTERIA SPEC CULT: NO GROWTH
C TRACH DNA SPEC QL NAA+PROBE: NEGATIVE
HBV SURFACE AG SERPL QL IA: NONREACTIVE
HIV 1+2 AB+HIV1 P24 AG SERPL QL IA: NONREACTIVE
N GONORRHOEA DNA SPEC QL NAA+PROBE: NEGATIVE
RUBV IGG SERPL IA-ACNC: 17 IU/ML
SPECIMEN SOURCE: NORMAL
T PALLIDUM AB SER QL: NONREACTIVE

## 2019-02-24 ENCOUNTER — MYC MEDICAL ADVICE (OUTPATIENT)
Dept: OBGYN | Facility: CLINIC | Age: 29
End: 2019-02-24

## 2019-02-24 DIAGNOSIS — A59.9 INFECTION DUE TO TRICHOMONAS: Primary | ICD-10-CM

## 2019-02-25 LAB
COPATH REPORT: NORMAL
PAP: NORMAL

## 2019-02-25 NOTE — TELEPHONE ENCOUNTER
Please check to see if this patient has scheduled her appt with MFM since this was faxed over on 2/20/19.  The patient will need desensitization in the hospital and not at home.

## 2019-02-25 NOTE — TELEPHONE ENCOUNTER
I called patient.  Patient is wondering if she can just take the Flagyl and take Benadryl with it.  I transferred the call to Dr. Obregon.  Ana Stringer, [facility]  February 25, 2019 5:16 PM

## 2019-02-25 NOTE — TELEPHONE ENCOUNTER
Dr. Obregon spoke with patient on 2/22 regarding results.  Forwarded to provider to advise (patient has Flagyl allergy).

## 2019-02-25 NOTE — TELEPHONE ENCOUNTER
Patient is calling to follow up status of request. Patient is okay being reached via [facility] as well. Please advise.

## 2019-02-25 NOTE — TELEPHONE ENCOUNTER
I explained to the patient that a referral to [facility] was made and sent on 2/20/19 so she was given their phone number to call since she stated that she has not heard from them.  I don't feel comfortable with the patient taking Flagyl at home, even with Benadryl, given her hx of lip swelling and facial itching in the past from this medication.  She voiced understanding and will f/u with [facility] as planned since she may need de-sensitization in the hospital for treatment.

## 2019-02-26 ENCOUNTER — MEDICAL CORRESPONDENCE (OUTPATIENT)
Dept: HEALTH INFORMATION MANAGEMENT | Facility: CLINIC | Age: 29
End: 2019-02-26

## 2019-02-26 ENCOUNTER — MYC MEDICAL ADVICE (OUTPATIENT)
Dept: OBGYN | Facility: CLINIC | Age: 29
End: 2019-02-26

## 2019-02-26 LAB
FINAL DIAGNOSIS: ABNORMAL
HPV HR 12 DNA CVX QL NAA+PROBE: POSITIVE
HPV16 DNA SPEC QL NAA+PROBE: NEGATIVE
HPV18 DNA SPEC QL NAA+PROBE: NEGATIVE
SPECIMEN DESCRIPTION: ABNORMAL
SPECIMEN SOURCE CVX/VAG CYTO: ABNORMAL

## 2019-02-26 NOTE — TELEPHONE ENCOUNTER
MFM referral, prenatal and labs faxed to [phone]. Yessenia Lowe RN on 2/26/2019 at 1:54 PM

## 2019-02-26 NOTE — TELEPHONE ENCOUNTER
Received phone call from pt. Pt c/o spotting and bleeding since this morning. Pt reports noted blood on toilet paper. Denies severe abd and pelvic pain. Mild cramping. Pt advised to monitor. If sx develop to call back.  Pt encouraged to stay hydrated  ounces of water a day and use Tylenol 1000 mg every 6-8 hours while awake. (do NOT exceed 3000 mg in a 24 time frame).  Pt verbalized understanding and agrees with plan of care. Pt also reports did not receive a phone call from [facility] yet.  Phone call placed to [facility], will resend referral. Pt advised. Yessenia Lowe RN on 2/26/2019 at 1:36 PM

## 2019-02-27 NOTE — PROGRESS NOTES
3/7/13 pap ASCUS + HR HPV (33/45) and (82)  4/5/13 colposcopy scheduled. Cancelled.  4/16/13 colposcopy. bx- HPV changes.  Plan-- repeat pap at 6 and 12 months. (due 10/16/13)   10/17/13 My Chart enc. OK to change recommendation per new ASCCP guidelines to pap/HPV due 1 year from colp.  May be done here or at [facility] if patient does not have insurance at that time.  10/1/14 NIL pap @ [facility].   4/26/18 ASC-H pap,  [facility]. Plan: May  5/11/18 May ECC: ELICEO 3. Care Everywhere - [facility]   10/1/18 consult with Dr. Obregon. Plan: LEEP cone of her cervix and she is now comfortable with this recommendation after discussing her concerns with Dr. Puga from [facility].  11/7/18 f/u with Dr. Obregon. Dr. Puga at [facility] advised LEEP was advised followed by a possible cerclage once the patient conceives.   11/13/18 LEEP bx: ELICEO 2 @ 3 o'clock with free margins. ECC: benign. Plan: cotest in 1 yr, due 11/13/19 2/20/19 NIL pap, + HR HPV (not 16 or 18) 11w2d pregnant. Plan: colp at 20 week gestation. Approx 5/1/19 2/28/19 Patient notified by phone.  4/9/19 cervical cerclage placed. Estimated Date of Delivery: Sep 17, 2019  5/16/19 OB check and pap result consult. Plan: colp pp. VICKI 9/17/19 9/25/19 My Chart Colposcopy Reminder message sent (rlm)  11/11/19 PP May not done, updated to 6mo May/Pap due by 12/10/19 (rlm)  12/2/19 My Chart May/Pap Reminder message sent (rlm)  12/12/19 My Chart not read, reminder letter sent (rlm)  01/09/20 [facility] clinic and schedule. (es)  2/10/20 Patient is lost to follow-up. Routed to provider as FYI. (rlm)

## 2019-02-28 ENCOUNTER — DOCUMENTATION ONLY (OUTPATIENT)
Dept: OBGYN | Facility: CLINIC | Age: 29
End: 2019-02-28

## 2019-02-28 ENCOUNTER — MEDICAL CORRESPONDENCE (OUTPATIENT)
Dept: OBGYN | Facility: CLINIC | Age: 29
End: 2019-02-28

## 2019-02-28 DIAGNOSIS — A59.9 TRICHOMONAS INFECTION: Primary | ICD-10-CM

## 2019-02-28 RX ORDER — METRONIDAZOLE 7.5 MG/G
GEL VAGINAL
Qty: 70 G | Refills: 0 | Status: SHIPPED | OUTPATIENT
Start: 2019-02-28 | End: 2019-03-22

## 2019-02-28 RX ORDER — DIPHENHYDRAMINE HCL 50 MG
50 CAPSULE ORAL EVERY 6 HOURS PRN
Qty: 20 CAPSULE | Refills: 0 | Status: ON HOLD | OUTPATIENT
Start: 2019-02-28 | End: 2019-04-09

## 2019-02-28 RX ORDER — METRONIDAZOLE 500 MG/1
500 TABLET ORAL 2 TIMES DAILY
Qty: 14 TABLET | Refills: 0 | Status: SHIPPED | OUTPATIENT
Start: 2019-02-28 | End: 2019-03-22

## 2019-02-28 NOTE — TELEPHONE ENCOUNTER
Pt states she was seen by an [facility] provider who states she does not need to be hospitalized for desensitization.  She states that the provider at [facility] said that Dr. Obregon can prescribe a vaginal antibiotic instead of going to the hospital.  Pt was wondering if Dr. Obregon had received a call from the provider at [facility].  I do not see an encounter that Dr. Obregon has spoke with the provider.  Pt would like to get this vaginal abx prescribed soon so she can get this treated.  She states she would like it sent to the [facility] [address]  Will route to Dr. Obregon.    Saadia Barber RN

## 2019-02-28 NOTE — TELEPHONE ENCOUNTER
Elle Obregon, DO   [facility] Triage 24 minutes ago (2:22 PM)      Please let the patient know that I did discuss her case with M this morning and a script for vaginal metronidazole has been sent to [facility] pharmacy on [address] with instructions along with Benadryl po prn.

## 2019-02-28 NOTE — TELEPHONE ENCOUNTER
Relayed Dr. Obregon's message below to pt.  I gave pt instructions for the vaginal Metrogel.  I also let her know that Dr. Obregon sent in an rx for Benadryl as well since pt has metronidazole on her allergy list.    Pt verbalized understanding and agreed to plan.    Saadia Barber RN

## 2019-02-28 NOTE — PROGRESS NOTES
I discussed this patient's case with [facility] this morning and the plan is to treat the patient at home with Flagyl per CDC guidelines since the Infectious Dx specialist stated that this patient is not at risk for an anaphylactic reaction.   The pt could take Benadryl po if needed for symptoms.  She emphasized that this patient does not need to be de-sensitized and so her script for the recommended 7-day course of Flagyl po was sent to her pharmacy with instructions.  This is to be used in the second or third trimester.

## 2019-03-22 ENCOUNTER — PRENATAL OFFICE VISIT (OUTPATIENT)
Dept: OBGYN | Facility: CLINIC | Age: 29
End: 2019-03-22
Payer: COMMERCIAL

## 2019-03-22 VITALS
DIASTOLIC BLOOD PRESSURE: 85 MMHG | BODY MASS INDEX: 31.07 KG/M2 | HEART RATE: 101 BPM | SYSTOLIC BLOOD PRESSURE: 123 MMHG | OXYGEN SATURATION: 97 % | WEIGHT: 174.7 LBS

## 2019-03-22 DIAGNOSIS — O09.891 HISTORY OF PRETERM DELIVERY, CURRENTLY PREGNANT IN FIRST TRIMESTER: ICD-10-CM

## 2019-03-22 DIAGNOSIS — O09.212 HISTORY OF PRETERM LABOR, CURRENT PREGNANCY, SECOND TRIMESTER: Primary | ICD-10-CM

## 2019-03-22 DIAGNOSIS — O09.529 SUPERVISION OF HIGH-RISK PREGNANCY OF ELDERLY MULTIGRAVIDA: ICD-10-CM

## 2019-03-22 PROCEDURE — 83498 ASY HYDROXYPROGESTERONE 17-D: CPT | Performed by: OBSTETRICS & GYNECOLOGY

## 2019-03-22 PROCEDURE — 99207 ZZC PRENATAL VISIT: CPT | Performed by: OBSTETRICS & GYNECOLOGY

## 2019-03-22 PROCEDURE — 36415 COLL VENOUS BLD VENIPUNCTURE: CPT | Performed by: OBSTETRICS & GYNECOLOGY

## 2019-03-22 RX ORDER — HYDROXYPROGESTERONE CAPROATE 250 MG/ML
250 INJECTION INTRAMUSCULAR
Status: DISCONTINUED | OUTPATIENT
Start: 2019-03-22 | End: 2019-09-11

## 2019-03-22 RX ORDER — EPINEPHRINE 0.3 MG/.3ML
0.3 INJECTION SUBCUTANEOUS
COMMUNITY
Start: 2019-02-21 | End: 2024-04-08

## 2019-03-22 NOTE — LETTER
[facility]  [id number] [place] [address] [facility] [address]  Phone: [phone]    03/22/19    Satinder Avalos  [address] [facility] [address]      To whom it may concern:     Please provide a standing desk for Satinder to use to relieve back pain issues that she is experiencing during pregnancy.    Sincerely,        Elle Obregon DO  Dept of OB/GYN  [phone]

## 2019-03-22 NOTE — PROGRESS NOTES
She has not felt flutters/fetal kicks yet but is still early.  She denies any fluid leakage or uterine contractions.  She gained 2 lbs since her last visit and will go to lab for her draw today.  The results of her last US were reviewed with the patient and the plan is to start Maranda 250 mg at 16 weeks gestation until 37 weeks.  Will check cervical length from 16-23 weeks gestation.  She has a [facility] referral also.

## 2019-03-26 ENCOUNTER — TELEPHONE (OUTPATIENT)
Dept: OBGYN | Facility: CLINIC | Age: 29
End: 2019-03-26

## 2019-03-26 LAB — 17OHP SERPL-MCNC: 346 NG/DL

## 2019-03-26 NOTE — TELEPHONE ENCOUNTER
[facility] Call Center    Phone Message    May a detailed message be left on voicemail: no    Reason for Call: Symptoms or Concerns     If patient has red-flag symptoms, warm transfer to triage line    Current symptom or concern: Pt feels she has the flu.  Asking for something that she can take. Is 14 weeks pregnant.    Symptoms have been present for:  4 days   Has patient previously been seen for this? No  Are there any new or worsening symptoms? Pt not feeling better. Is asking to speak with a nurse.        Action Taken: Message routed to:  [facility] Clinic p [phone]

## 2019-03-26 NOTE — TELEPHONE ENCOUNTER
Pt states that she thinks she got sick from her step-son.  Her step son wasn't diagnosed with influenza but she thinks she has it.  She is wondering if there is anything she can take to help her symptoms.    Pt is complaining of body aches and cough.  She did have a fever but no fever today.  She denies runny nose or nasal congestion.  Denies signs of dehydration.  Denies shortness or breath.  Advised pt to rest, drink plenty of fluids, Tylenol for body aches, cough drops without ZINC or plain Robitussin.  Pt states her symptoms started on Saturday so she is past the 24-48 hours to start Tamiflu.  Will route to OB provider for further recommendations.    Saadia Barber RN

## 2019-03-28 NOTE — TELEPHONE ENCOUNTER
Please tell her that since she is symptomatic, she would have had to get the Tamiflu within 2 days so is too late now.  If she did not have symptoms but had known exposure, then she could get Tamiflu.

## 2019-03-28 NOTE — TELEPHONE ENCOUNTER
Relayed Dr. Obregon's message to pt.  Pt states she is starting to feel better.  I advised that she continue to drink lots of fluids and rest.    Saadia Barber RN

## 2019-03-28 NOTE — TELEPHONE ENCOUNTER
Left message for pt to return call to clinic to relay Dr. Obregon's message below.  I did discuss this with pt when I spoke with her 2 days ago.    Saadia Barber RN

## 2019-04-05 ENCOUNTER — TELEPHONE (OUTPATIENT)
Dept: OBGYN | Facility: CLINIC | Age: 29
End: 2019-04-05
Payer: MEDICAID

## 2019-04-05 ENCOUNTER — TRANSFERRED RECORDS (OUTPATIENT)
Dept: HEALTH INFORMATION MANAGEMENT | Facility: CLINIC | Age: 29
End: 2019-04-05

## 2019-04-05 ENCOUNTER — TELEPHONE (OUTPATIENT)
Dept: OBGYN | Facility: CLINIC | Age: 29
End: 2019-04-05

## 2019-04-05 DIAGNOSIS — O09.892 HISTORY OF PRETERM DELIVERY, CURRENTLY PREGNANT IN SECOND TRIMESTER: Primary | ICD-10-CM

## 2019-04-05 DIAGNOSIS — Z53.9 ERRONEOUS ENCOUNTER--DISREGARD: Primary | ICD-10-CM

## 2019-04-05 RX ORDER — HYDROXYPROGESTERONE CAPROATE 250 MG/ML
250 INJECTION INTRAMUSCULAR
Qty: 250 ML | Refills: 20 | Status: SHIPPED | OUTPATIENT
Start: 2019-04-05 | End: 2019-09-11

## 2019-04-05 NOTE — TELEPHONE ENCOUNTER
I spoke to patient.  The prescription for Ingram was not sent to the [facility]. Dr. Obregon just now is sending them the prescription.  Not sure where the original prescription went on 3/22/19.  I called [facility] and they did just receive the prescription and are going to put a rush on it.  I advised that if she hasn't heard from our [facility] by Tuesday to give us a call.

## 2019-04-05 NOTE — TELEPHONE ENCOUNTER
[facility] Call Center    Phone Message    May a detailed message be left on voicemail: no    Reason for Call: Other: Pt asking if she should be getting progesterone injections. Pt is 16 weeks pregnant.  Please call back.       Action Taken: Message routed to:  [facility] p [phone]

## 2019-04-05 NOTE — TELEPHONE ENCOUNTER
Pt notified of provider message as written.  Pt advised someone would call her today or Monday to get a sooner appointment.  Sara BAUTISTAN, RN

## 2019-04-05 NOTE — TELEPHONE ENCOUNTER
This patient is to start [place] at 16 weeks gestation so cannot wait to be seen on 4/19/19.  Please call to get her scheduled ASAP and let the pt know.

## 2019-04-05 NOTE — TELEPHONE ENCOUNTER
Per 3/22/19 prenatal OV, Dr Obregon mentions starting Maranda at 16 weeks.  Patient is 16w 3d right now and next Ob appointment isnt until 4/19/19:    Progress Notes      She has not felt flutters/fetal kicks yet but is still early.  She denies any fluid leakage or uterine contractions.  She gained 2 lbs since her last visit and will go to lab for her draw today.  The results of her last US were reviewed with the patient and the plan is to start Mraanda 250 mg at 16 weeks gestation until 37 weeks.  Will check cervical length from 16-23 weeks gestation.  She has a MFM referral also.                    Routing to Ob provider to advise if medication will be ordered now or if patient should move her 4/19/19 up sooner.     Chey Cullen RN, BSN

## 2019-04-08 ENCOUNTER — ANESTHESIA EVENT (OUTPATIENT)
Dept: OBGYN | Facility: CLINIC | Age: 29
End: 2019-04-08
Payer: MEDICAID

## 2019-04-08 NOTE — ANESTHESIA PREPROCEDURE EVALUATION
Anesthesia Pre-Procedure Evaluation    Patient: Satinder Avalos   MRN:     [id number] Gender:   female   Age:    28 year old :      1990        Preoperative Diagnosis: History Of  Labor   Procedure(s):  CERCLAGE CERVICAL     Past Medical History:   Diagnosis Date     Abnormal Pap smear of cervix 2013,     see problem list     PID (acute pelvic inflammatory disease)     age 10      Past Surgical History:   Procedure Laterality Date     CONIZATION LEEP N/A 2018    Procedure: EUA, COLPOSCOPY, LEEP;  Surgeon: Elle Obregon DO;  Location: [facility]          Anesthesia Evaluation     . Pt has had prior anesthetic. Type: MAC           ROS/MED HX    ENT/Pulmonary:       Neurologic:       Cardiovascular:         METS/Exercise Tolerance:     Hematologic:         Musculoskeletal:         GI/Hepatic:         Renal/Genitourinary:         Endo:         Psychiatric:         Infectious Disease:         Malignancy:         Other: Comment: .  H/o LEEP on 18.  H/o  delivery at 25 weeks.  Presenting for cervical cerclage.                      [facility] AN PHYSICAL EXAM    Lab Results   Component Value Date    WBC 12.6 (H) 2019    HGB 13.3 2019    HCT 38.5 2019     2019     2009    POTASSIUM 3.8 2009    CHLORIDE 104 2009    CO2 27 2009    BUN 6 2009    CR 0.60 2009    GLC 90 2013    SHANNON 9.3 2009    MAG 7.5 (HH) 2005    ALBUMIN 4.2 2009    PROTTOTAL 8.4 2009    ALT 27 2009    AST 36 2009    ALKPHOS 87 2009    BILITOTAL 0.5 2009    INR 0.94 2005    FIBR 738 (H) 2005    TSH 1.43 2013    HCG Negative 2013    HCGS Negative 11/10/2007       Preop Vitals  BP Readings from Last 3 Encounters:   19 123/85   19 111/72   19 118/79    Pulse Readings from Last 3 Encounters:   19 101   19 74   19 98      Resp  "Readings from Last 3 Encounters:   11/13/18 16   04/23/13 20   04/16/13 16    SpO2 Readings from Last 3 Encounters:   03/22/19 97%   02/20/19 100%   11/29/18 98%      Temp Readings from Last 1 Encounters:   02/20/19 36.4  C (97.6  F) (Oral)    Ht Readings from Last 1 Encounters:   02/20/19 1.597 m (5' 2.87\")      Wt Readings from Last 1 Encounters:   03/22/19 79.2 kg (174 lb 11.2 oz)    Estimated body mass index is 31.07 kg/m  as calculated from the following:    Height as of 2/20/19: 1.597 m (5' 2.87\").    Weight as of 3/22/19: 79.2 kg (174 lb 11.2 oz).     LDA:  Peripheral IV 11/13/18 Left  (Active)   Number of days: 146            Assessment:   ASA SCORE: 2       Documentation: H&P complete; Preop Testing complete; Consents complete   Proceeding: Proceed without further delay  Tobacco Use:  NO Active use of Tobacco/UNKNOWN Tobacco use status     Plan:   Anes. Type:  Regional; MAC     RA Details:  Labor/OB Procedure; SS     RA-Location/Type: Spinal   Pre-Induction: None   Induction:  Not applicable   Airway: Native Airway   Access/Monitoring: PIV   Maintenance: N/a   Emergence: N/a   Logistics: Observation/Admission     Postop Pain/Sedation Strategy:  Standard-Options: Block SS     PONV Management:  Adult Risk Factors: Female, Non-Smoker  Prevention: Ondansetron                         Dario Hurt MD  "

## 2019-04-09 ENCOUNTER — ANESTHESIA (OUTPATIENT)
Dept: OBGYN | Facility: CLINIC | Age: 29
End: 2019-04-09
Payer: MEDICAID

## 2019-04-09 ENCOUNTER — HOSPITAL ENCOUNTER (OUTPATIENT)
Facility: CLINIC | Age: 29
Discharge: HOME | End: 2019-04-09
Attending: OBSTETRICS & GYNECOLOGY | Admitting: OBSTETRICS & GYNECOLOGY
Payer: MEDICAID

## 2019-04-09 VITALS
HEART RATE: 80 BPM | RESPIRATION RATE: 18 BRPM | OXYGEN SATURATION: 100 % | TEMPERATURE: 98.6 F | SYSTOLIC BLOOD PRESSURE: 118 MMHG | DIASTOLIC BLOOD PRESSURE: 78 MMHG

## 2019-04-09 DIAGNOSIS — O34.40 HISTORY OF CERVICAL LEEP BIOPSY AFFECTING CARE OF MOTHER, ANTEPARTUM: Primary | ICD-10-CM

## 2019-04-09 DIAGNOSIS — Z98.890 HISTORY OF CERVICAL LEEP BIOPSY AFFECTING CARE OF MOTHER, ANTEPARTUM: Primary | ICD-10-CM

## 2019-04-09 LAB
ABO + RH BLD: NORMAL
ABO + RH BLD: NORMAL
BLD GP AB SCN SERPL QL: NORMAL
BLOOD BANK CMNT PATIENT-IMP: NORMAL
HGB BLD-MCNC: 12.5 G/DL (ref 11.7–15.7)
PLATELET # BLD AUTO: 367 10E9/L (ref 150–450)
SPECIMEN EXP DATE BLD: NORMAL

## 2019-04-09 PROCEDURE — 25800030 ZZH RX IP 258 OP 636: Performed by: STUDENT IN AN ORGANIZED HEALTH CARE EDUCATION/TRAINING PROGRAM

## 2019-04-09 PROCEDURE — 25000128 H RX IP 250 OP 636: Performed by: STUDENT IN AN ORGANIZED HEALTH CARE EDUCATION/TRAINING PROGRAM

## 2019-04-09 PROCEDURE — 86850 RBC ANTIBODY SCREEN: CPT | Performed by: STUDENT IN AN ORGANIZED HEALTH CARE EDUCATION/TRAINING PROGRAM

## 2019-04-09 PROCEDURE — 25000132 ZZH RX MED GY IP 250 OP 250 PS 637

## 2019-04-09 PROCEDURE — 86901 BLOOD TYPING SEROLOGIC RH(D): CPT | Performed by: STUDENT IN AN ORGANIZED HEALTH CARE EDUCATION/TRAINING PROGRAM

## 2019-04-09 PROCEDURE — 96361 HYDRATE IV INFUSION ADD-ON: CPT

## 2019-04-09 PROCEDURE — 71000014 ZZH RECOVERY PHASE 1 LEVEL 2 FIRST HR: Performed by: OBSTETRICS & GYNECOLOGY

## 2019-04-09 PROCEDURE — 36000045 ZZH SURGERY LEVEL 1 1ST 30 MIN - UMMC: Performed by: OBSTETRICS & GYNECOLOGY

## 2019-04-09 PROCEDURE — 27210794 ZZH OR GENERAL SUPPLY STERILE: Performed by: OBSTETRICS & GYNECOLOGY

## 2019-04-09 PROCEDURE — 96360 HYDRATION IV INFUSION INIT: CPT

## 2019-04-09 PROCEDURE — 85049 AUTOMATED PLATELET COUNT: CPT | Performed by: STUDENT IN AN ORGANIZED HEALTH CARE EDUCATION/TRAINING PROGRAM

## 2019-04-09 PROCEDURE — 25000132 ZZH RX MED GY IP 250 OP 250 PS 637: Performed by: STUDENT IN AN ORGANIZED HEALTH CARE EDUCATION/TRAINING PROGRAM

## 2019-04-09 PROCEDURE — 37000008 ZZH ANESTHESIA TECHNICAL FEE, 1ST 30 MIN: Performed by: OBSTETRICS & GYNECOLOGY

## 2019-04-09 PROCEDURE — 86900 BLOOD TYPING SEROLOGIC ABO: CPT | Performed by: STUDENT IN AN ORGANIZED HEALTH CARE EDUCATION/TRAINING PROGRAM

## 2019-04-09 PROCEDURE — 40000170 ZZH STATISTIC PRE-PROCEDURE ASSESSMENT II: Performed by: OBSTETRICS & GYNECOLOGY

## 2019-04-09 PROCEDURE — 37000009 ZZH ANESTHESIA TECHNICAL FEE, EACH ADDTL 15 MIN: Performed by: OBSTETRICS & GYNECOLOGY

## 2019-04-09 PROCEDURE — 25800030 ZZH RX IP 258 OP 636

## 2019-04-09 PROCEDURE — G0463 HOSPITAL OUTPT CLINIC VISIT: HCPCS

## 2019-04-09 PROCEDURE — 36000047 ZZH SURGERY LEVEL 1 EA 15 ADDTL MIN - UMMC: Performed by: OBSTETRICS & GYNECOLOGY

## 2019-04-09 PROCEDURE — 85018 HEMOGLOBIN: CPT | Performed by: STUDENT IN AN ORGANIZED HEALTH CARE EDUCATION/TRAINING PROGRAM

## 2019-04-09 RX ORDER — MEPERIDINE HYDROCHLORIDE 25 MG/ML
12.5 INJECTION INTRAMUSCULAR; INTRAVENOUS; SUBCUTANEOUS
Status: DISCONTINUED | OUTPATIENT
Start: 2019-04-09 | End: 2019-04-09 | Stop reason: HOSPADM

## 2019-04-09 RX ORDER — ACETAMINOPHEN 325 MG/1
650 TABLET ORAL
Status: DISCONTINUED | OUTPATIENT
Start: 2019-04-09 | End: 2019-04-09 | Stop reason: HOSPADM

## 2019-04-09 RX ORDER — ONDANSETRON 4 MG/1
4 TABLET, ORALLY DISINTEGRATING ORAL EVERY 30 MIN PRN
Status: DISCONTINUED | OUTPATIENT
Start: 2019-04-09 | End: 2019-04-09 | Stop reason: HOSPADM

## 2019-04-09 RX ORDER — CITRIC ACID/SODIUM CITRATE 334-500MG
SOLUTION, ORAL ORAL
Status: COMPLETED
Start: 2019-04-09 | End: 2019-04-09

## 2019-04-09 RX ORDER — BUPIVACAINE HYDROCHLORIDE 7.5 MG/ML
INJECTION, SOLUTION INTRASPINAL
Status: COMPLETED
Start: 2019-04-09 | End: 2019-04-09

## 2019-04-09 RX ORDER — SODIUM CHLORIDE, SODIUM LACTATE, POTASSIUM CHLORIDE, CALCIUM CHLORIDE 600; 310; 30; 20 MG/100ML; MG/100ML; MG/100ML; MG/100ML
INJECTION, SOLUTION INTRAVENOUS
Status: COMPLETED
Start: 2019-04-09 | End: 2019-04-09

## 2019-04-09 RX ORDER — SODIUM CHLORIDE, SODIUM LACTATE, POTASSIUM CHLORIDE, CALCIUM CHLORIDE 600; 310; 30; 20 MG/100ML; MG/100ML; MG/100ML; MG/100ML
INJECTION, SOLUTION INTRAVENOUS CONTINUOUS
Status: DISCONTINUED | OUTPATIENT
Start: 2019-04-09 | End: 2019-04-09

## 2019-04-09 RX ORDER — BUPIVACAINE HYDROCHLORIDE 7.5 MG/ML
INJECTION, SOLUTION INTRASPINAL PRN
Status: DISCONTINUED | OUTPATIENT
Start: 2019-04-09 | End: 2019-04-09

## 2019-04-09 RX ORDER — ONDANSETRON 2 MG/ML
4 INJECTION INTRAMUSCULAR; INTRAVENOUS EVERY 30 MIN PRN
Status: DISCONTINUED | OUTPATIENT
Start: 2019-04-09 | End: 2019-04-09 | Stop reason: HOSPADM

## 2019-04-09 RX ORDER — SODIUM CHLORIDE, SODIUM LACTATE, POTASSIUM CHLORIDE, CALCIUM CHLORIDE 600; 310; 30; 20 MG/100ML; MG/100ML; MG/100ML; MG/100ML
INJECTION, SOLUTION INTRAVENOUS CONTINUOUS PRN
Status: DISCONTINUED | OUTPATIENT
Start: 2019-04-09 | End: 2019-04-09

## 2019-04-09 RX ORDER — NALOXONE HYDROCHLORIDE 0.4 MG/ML
.1-.4 INJECTION, SOLUTION INTRAMUSCULAR; INTRAVENOUS; SUBCUTANEOUS
Status: DISCONTINUED | OUTPATIENT
Start: 2019-04-09 | End: 2019-04-09 | Stop reason: HOSPADM

## 2019-04-09 RX ADMIN — ACETAMINOPHEN 650 MG: 325 TABLET, FILM COATED ORAL at 12:09

## 2019-04-09 RX ADMIN — BUPIVACAINE HYDROCHLORIDE IN DEXTROSE 1.5 ML: 7.5 INJECTION, SOLUTION SUBARACHNOID at 10:36

## 2019-04-09 RX ADMIN — SODIUM CHLORIDE, POTASSIUM CHLORIDE, SODIUM LACTATE AND CALCIUM CHLORIDE 1000 ML: 600; 310; 30; 20 INJECTION, SOLUTION INTRAVENOUS at 09:21

## 2019-04-09 RX ADMIN — SODIUM CITRATE AND CITRIC ACID MONOHYDRATE 30 ML: 500; 334 SOLUTION ORAL at 10:26

## 2019-04-09 RX ADMIN — SODIUM CHLORIDE, POTASSIUM CHLORIDE, SODIUM LACTATE AND CALCIUM CHLORIDE: 600; 310; 30; 20 INJECTION, SOLUTION INTRAVENOUS at 11:42

## 2019-04-09 RX ADMIN — SODIUM CHLORIDE, POTASSIUM CHLORIDE, SODIUM LACTATE AND CALCIUM CHLORIDE: 600; 310; 30; 20 INJECTION, SOLUTION INTRAVENOUS at 10:25

## 2019-04-09 NOTE — ANESTHESIA CARE TRANSFER NOTE
Patient: Satinder Avalos    Procedure(s):  CERCLAGE CERVICAL    Diagnosis: History Of  Labor  Diagnosis Additional Information: No value filed.    Anesthesia Type:   No value filed.     Note:  Airway :Room Air  Patient transferred to:PACU  Handoff Report: Identifed the Patient, Identified the Reponsible Provider, Reviewed the pertinent medical history, Discussed the surgical course, Reviewed Intra-OP anesthesia mangement and issues during anesthesia, Set expectations for post-procedure period and Allowed opportunity for questions and acknowledgement of understanding      Vitals: (Last set prior to Anesthesia Care Transfer)    CRNA VITALS  2019 1101 - 2019 1134      2019             Pulse:  98    SpO2:  100 %                Electronically Signed By: Dario Hurt MD  2019  11:34 AM

## 2019-04-09 NOTE — H&P
[facility] Labor and Delivery History and Physical    Satinder Avalos MRN# [id number]   Age: 28 year old YOB: 1990     Date of Admission: (Not on file)    Primary care provider: No Ref-Primary, Physician         HPI:     Satinder Avalos is a 28 year old  at 17w0d by 6w1d US who presents for cerclage placement. She has a hx of LEEP () and prior  delivery at 25 wks (), She was being followed with serial cervical lengths and her cervix was found to be 2.3-2.49cm, therefore a cerclage was recommended.   She is also on weekly 17-OHP.    Patient reports doing okay today. She does have some pressure in her low back.  She has not yet felt the baby move. She denies loss of fluid, vaginal bleeding, or regular contractions.  She denies HA, vision changes, chest pain, shortness of breath, nausea, vomiting, or dysuria.      OB Problem List:   1. IUP at 17w0d   2. History of  delivery at 25 wks; had  cervical dilation of 3 cm at 23 6/7 weeks in that pregnancy.  3. History of LEEP          Pregnancy history:     OBSTETRIC HISTORY:    OB History    Para Term  AB Living   4 1 0 1 2 1   SAB TAB Ectopic Multiple Live Births   0 0 0 0 1      # Outcome Date GA Lbr Saul/2nd Weight Sex Delivery Anes PTL Lv   4 Current            3  / 25w0d  0.624 kg (1 lb 6 oz) F Vag-Spont   CHAYITO   2 AB            1 AB              In , when she was 14 years-old, she had a 25 week delivery. She reports a couple weeks of cramping/contractions, which she reports she was told was normal. At 23 & 6/7 weeks she was found to be 3 cm dilated and was admitted to the [facility]. Per the record she was given betamethasone, magnesium sulfate and indomethacin. She had a precipitous vaginal delivery at 25 & 2/7 weeks. She has a history of 2 TABs.     Prenatal Labs:   Lab Results   Component Value Date    ABO PENDING 2019    RH Pos 2019     AS PENDING 2019    HEPBANG Nonreactive 2019    CHPCRT Negative 2019    GCPCRT Negative 2019    TREPAB Negative 2011    RUBELLAABIGG 56 2005    HGB 12.5 2019    HIV Negative 2011       GBS Status:   No results found for: GBS    Active Problem List  Patient Active Problem List   Diagnosis     Chronic left SI joint pain     CARDIOVASCULAR SCREENING; LDL GOAL LESS THAN 160     Bacterial vaginosis     Drug allergy     Dysplasia of cervix, high grade ELICEO 2     Elevated BP     Obesity     Flu vaccine need     History of cervical LEEP biopsy affecting care of mother, antepartum     History of  delivery, currently pregnant in first trimester       Medication Prior to Admission  Facility-Administered Medications Prior to Admission   Medication Dose Route Frequency Provider Last Rate Last Dose     HYDROXYprogesterone caproate (PLACDIO) intramuscular injection 250 mg  250 mg Intramuscular Q7 Days Elle Obregon, DO         Medications Prior to Admission   Medication Sig Dispense Refill Last Dose     Prenatal Vit-Fe Fumarate-FA (PNV PRENATAL PLUS MULTIVITAMIN) 27-1 MG TABS per tablet Take 1 tablet by mouth daily   2019 at Unknown time     EPINEPHrine (EPIPEN 2-LEOBARDO) 0.3 MG/0.3ML injection 2-pack Inject 0.3 mg into the muscle   More than a month at Unknown time     HYDROXYprogesterone caproate (PLACIDO) 250 MG/ML injection Inject 1 mL (250 mg) into the muscle every 7 days Provide weekly from 16-37 weeks gestation 250 mL 20 More than a month at Unknown time           Maternal Past Medical History:     Past Medical History:   Diagnosis Date     Abnormal Pap smear of cervix 2013, 2018    see problem list     PID (acute pelvic inflammatory disease)     age 10                       Family History:     Family History   Problem Relation Age of Onset     Psychotic Disorder Mother         drug/alcohol abuse     Substance Abuse Mother      Psychotic Disorder Father          drug/alcohol abuse     Diabetes Father      Respiratory Maternal Grandfather         copd     Respiratory Paternal Grandmother         copd     Other Cancer Maternal Grandmother         lung     Heart Disease Maternal Grandmother      Unknown/Adopted Paternal Grandfather      Dementia Paternal Grandfather             Social History:     Social History     Socioeconomic History     Marital status: Single     Spouse name: Not on file     Number of children: Not on file     Years of education: Not on file     Highest education level: Not on file   Occupational History     Occupation: PCA    Social Needs     Financial resource strain: Not on file     Food insecurity:     Worry: Not on file     Inability: Not on file     Transportation needs:     Medical: Not on file     Non-medical: Not on file   Tobacco Use     Smoking status: Never Smoker     Smokeless tobacco: Never Used   Substance and Sexual Activity     Alcohol use: Yes     Frequency: Never     Comment: couple drinks every couple of weeks- not in PG     Drug use: No     Types: Marijuana     Comment: stopped with pregnancy     Sexual activity: Yes     Partners: Male     Birth control/protection: None   Lifestyle     Physical activity:     Days per week: Not on file     Minutes per session: Not on file     Stress: Not on file   Relationships     Social connections:     Talks on phone: Not on file     Gets together: Not on file     Attends Buddhist service: Not on file     Active member of club or organization: Not on file     Attends meetings of clubs or organizations: Not on file     Relationship status: Not on file     Intimate partner violence:     Fear of current or ex partner: Not on file     Emotionally abused: Not on file     Physically abused: Not on file     Forced sexual activity: Not on file   Other Topics Concern     Parent/sibling w/ CABG, MI or angioplasty before 65F 55M? 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   Cervix:  deferred  Fetal Heart Rate Tracins    Imaging:    US 2019:   \"24.9-25.1 cm --> 23.1-25.3 with Valsalva  1) Intrauterine pregnancy at 16 & 3/7 weeks gestational age.  2) The amniotic fluid measurement is within normal limits.  3) The cervix is closed, but short. There is no evidence of funneling or change with Valsalva.\"          Assessment/Plan:   Satinder Avalos is a 28 year old  at 17w0d by 6w1d US admitted for ultrasound indicated cerclage. She has a history of  delivery at 25 weeks and was found to have a cervical length of < 2.5cm on routine US in the current pregnancy.  The plan will be for a Flores cerclage although she does understand that she may require a Shirodkar if Flores. She was counseled regarding the risks, benefits and alternatives of a cerclage and she would like to proceed.   - routine labs, plan for discharge after procedure  - FHT normal  - plan for spinal     Kirsty Promise, PGY3  OB/Gyn  2019, 6:36 AM    Physician Attestation   I, Natalie Villa, saw this patient with the resident and agree with the resident/fellow's findings and plan of care as documented in the note.      I personally reviewed vital signs, medications, labs and imaging.    Key findings: History of  delivery at 25 weeks gestation with interval LEEP procedure for ELICEO III. Ultrasound screening of cervical length demonstrated a short cervix and therefore cervical cerclage was recommended. We reviewed the risk, benefits and alternatives for cervical cerclage. We discussed the plan for proceeding with Flores cerclage, but that in the event a " Flores cerclage cannot be placed due to her anatomy then we would plan to proceed with Shirodkar cerclage with the assistance of Dr. Puga. Plan spinal anesthesia, which Aidennaina states that she is nervous about given she has not previously had it. Will plan anesthesiology to see patient now and proceed after.    Natalie Villa MD  Date of Service (when I saw the patient): 04/09/19

## 2019-04-09 NOTE — OP NOTE
Operative Note: Cervical Cerclage           Pre-Op Diagnosis:   1) Single intrauterine pregnancy at 17w0d  2) Short cervix  3) History of  birth         Post-Op Diagnosis:   1) same         Procedure:   1) ultrasound indicated cervical cerclage,  Flores Type, ( 2 sutures)          Surgeons:     Attending:Natalie Villa MD  Fellow: Maximus Arroyo MD   Resident: Kirsty Briggs         Anesthesia:   Spinal          Estimated Blood Loss:   10 cc         Findings:   1) cervix visually closed  prior to the procedure  2) fetal heart tones confirmed by doptone following the procedure         Specimens:   1) none         Complications:   None apparent          History:   Satinder Avalos is a 28 year old  at 17w0d by 6w1d US  who presents for cerclage placement. She has a hx of LEEP (2018) and prior  delivery at 25 wks (), She was being followed with serial cervical lengths and her cervix was found to be 2.3-2.49cm, therefore a cerclage was recommended.    Discussed indications for cerclage and explained the risks of cerclage include but are not limited to bleeding, infection, PPROM, chorioamnionitis, pregnancy loss, VTE and extremely minimal but possible risk of fatal outcomes. Patient voiced understanding, agreed to procedure and signed consents.         Details of Procedure:     The patient was taken to the operative room where a time out was performed to confirm   patient name and correct procedure.  Patient underwent spinal anesthesia. After after confirmation of anesthesia the patient was placed in the high dorsal lithotomy with legs supported with stirrups.She was prepped and draped in the usual fashion. A straight catheter was used to drain her bladder.      A weighted speculum was placed into the vagina and ho retractors were used to visualize the cervix. The cervix appeared closed visually. The vagina and cervix were copiously cleansed with betadine solution.  The cervix was manipulated with ring forceps.      Two sutures applied for this cerclage. A  #2 Ethilon suture was used and this was placed in a  circumferential manner close to the level of the cervico-vesical junction and the knot was tied at 12 o'clock .  A subsequent stitch was applied intercalating the first suture more distal from the previous stitch.This suture was securely tied down and digital exam confirmed that the cervix was closed.     The bladder was drained of clear urine and a rectal exam confirmed that no sutures were present in the rectum. The cervix and vaginal vault were inspected and noted to be free of injury and hemostatic.       The instruments were removed from the vagina, the patient was transferred to the recovery room in satisfactory condition.     Sponge and needle counts were correct at the end of the case x 2    Fetal heart tones were confirmed after the procedure.      Dr. Villa was present during the entire procedure.     Maximus Arroyo  Maternal Fetal Medicine Fellow   April 9, 2019    I was scrubbed and present for the entire procedure and I agree with the operative note as outline above.    Natalie Villa MD  Specialist in Maternal-Fetal Medicine

## 2019-04-09 NOTE — DISCHARGE INSTRUCTIONS
Discharge Instruction for Undelivered Patients      You were seen for: cerclage placement  We Consulted: Dr. Villa  You had (Test or Medicine):cervical cerclage     Diet:   Drink 8 to 12 glasses of liquids (milk, juice, water) every day.  You may eat meals and snacks.     Activity:  Count fetal kicks everyday (see handout)     Call your provider if you notice:  Swelling in your face or increased swelling in your hands or legs.  Headaches that are not relieved by Tylenol (acetaminophen).  Changes in your vision (blurring: seeing spots or stars.)  Nausea (sick to your stomach) and vomiting (throwing up).   Weight gain of 5 pounds or more per week.  Heartburn that doesn't go away.  Signs of bladder infection: pain when you urinate (use the toilet), need to go more often and more urgently.  The bag of valverde (rupture of membranes) breaks, or you notice leaking in your underwear.  Bright red blood in your underwear.  Abdominal (lower belly) or stomach pain.  Second (plus) baby: Contractions (tightening) less than 10 minutes apart and getting stronger.  *If less than 34 weeks: Contractions (tightenings) more than 6 times in one hour.  Increase or change in vaginal discharge (note the color and amount)      Follow-up:  As scheduled in the clinic

## 2019-04-09 NOTE — BRIEF OP NOTE
[facility], [place]    Brief Operative Note    Pre-operative diagnosis: History Of  Labor, short cervix  Post-operative diagnosis  same   Procedure: Procedure(s):  CERCLAGE CERVICAL  Surgeon: Surgeon(s) and Role:     * Natalie Villa MD - Primary     * Kirsty Virgen MD - Resident - Assisting     * Maximus Whittaker MD - Fellow - Assisting  Anesthesia: Spinal   Estimated blood loss: Less than 10 ml  Drains: None  Specimens: None  Findings:   Multiparous appearing cervix, more cervix on the anterior than posterior from prior leep. Clear urine at end of case. No stitches in rectum. 2x #2 ethicon sutures, both knots at 12 oclock.  Complications: None.  Implants: None.      Kirsty Virgen PGY-3

## 2019-04-09 NOTE — DISCHARGE SUMMARY
[facility] Discharge Summary    Satinder Avalos MRN# [id number]   Age: 28 year old YOB: 1990     Date of Admission:  2019  Date of Discharge:  2019  Admitting Physician:  Natalie Villa MD  Discharge Physician:  Natalie Villa MD     Admission Diagnosis:  1) Cervical shortening at 17 0/7 weeks  2) History of  birth    Discharge Diagnosis:  1) Cervical shortening at 17 0/7 weeks  2) History of  birth    Procedures:  Ultrasound indicated Flores cerclage - 2 sutures in situ    Consultations:  None    Medications prior to admission:  Facility-Administered Medications Prior to Admission   Medication Dose Route Frequency Provider Last Rate Last Dose     HYDROXYprogesterone caproate (PLACIDO) intramuscular injection 250 mg  250 mg Intramuscular Q7 Days Elle Obregon, DO         Medications Prior to Admission   Medication Sig Dispense Refill Last Dose     Prenatal Vit-Fe Fumarate-FA (PNV PRENATAL PLUS MULTIVITAMIN) 27-1 MG TABS per tablet Take 1 tablet by mouth daily   2019 at Unknown time     EPINEPHrine (EPIPEN 2-LEOBARDO) 0.3 MG/0.3ML injection 2-pack Inject 0.3 mg into the muscle   More than a month at Unknown time     HYDROXYprogesterone caproate (PLACIDO) 250 MG/ML injection Inject 1 mL (250 mg) into the muscle every 7 days Provide weekly from 16-37 weeks gestation 250 mL 20 More than a month at Unknown time         Brief History of Presentation: Satinder Avalos is a 28 year old  at 17w0d by 6w1d US who presents for cerclage placement. She has a history of LEEP () and prior  delivery at 25 wks (). She was being followed with serial cervical lengths given her history of  delivery and her cervix was found to be 2.3-2.49cm, therefore a cerclage was recommended. Satinder is also on weekly 17-OHP to reduce the risk for recurrent  birth..     Hospital Course: Satinder consented to proceed with  cerclage placement. Spinal anesthesia was administered. She underwent placement of a Flores cerclage (2 sutures) without complications. She was discharged home once she was tolerating diet, able to ambulate and void.    Discharge Instructions:  Call or present to labor and delivery if you experience:   -Regular painful contractions concerning for labor   -Leakage of fluid concerning for ruptured membranes   -Bright red vaginal bleeding    -Fevers or chills   -Pelvic rest   -Avoidance of heavy lifting or strenuous exercise    Follow up:  Follow up in [facility] clinic as scheduled for comprehensive ultrasound  Follow up with Dr. Obregon as scheduled  Cervical cerclage to be removed at 36-37 weeks gestation, and can be performed with Dr. Obregon or [facility]    Discharge Medications:  No medications were prescribed on discharge    Natalie Villa MD  Specialist in Maternal-Fetal Medicine

## 2019-04-09 NOTE — PLAN OF CARE
Data: [facility].   Reason for maternal/fetal assessment per patient is cerclage  . Patient is a . Prenatal record reviewed.      OB History    Para Term  AB Living   4 1 0 1 2 1   SAB TAB Ectopic Multiple Live Births   0 0 0 0 1      # Outcome Date GA Lbr Saul/2nd Weight Sex Delivery Anes PTL Lv   4 Current            3  05 25w0d  0.624 kg (1 lb 6 oz) F Vag-Spont   CHAYITO   2 AB            1 AB               Medical History:   Past Medical History:   Diagnosis Date     Abnormal Pap smear of cervix 2013, 2018    see problem list     PID (acute pelvic inflammatory disease)     age 10   . Gestational Age 17w0d. VSS. Cervix: cerclage placed. Patient denies backache, vaginal discharge, pelvic pressure, UTI symptoms, GI problems, bloody show, vaginal bleeding, edema, headache, visual disturbances, epigastric or URQ pain, abdominal pain, rupture of membranes. Support persons Carlito present.  Action: Verbal consent for EFM. Triage assessment completed. Fetal assessment: dopplers pre and post cerclage. Patient education pamphlets given on cerclage. Patient instructed to report change in vaginal leaking of fluid or bleeding, abdominal pain, or any concerns related to the pregnancy to her nurse/physician.   Response: Dr. Villa and Dr. Virgen informed of patient's ability to ambulate, urinate and eat following cerclage placement. Plan per provider is discharge home. Patient verbalized understanding of education and verbalized agreement with plan. Discharged ambulatory at 1759.

## 2019-04-09 NOTE — ANESTHESIA PROCEDURE NOTES
Spinal/LP Procedure Note    Spinal Block  Staff:     Anesthesiologist:  Lucy Meek MD    Resident/CRNA:  Dario Hurt MD    Spinal/LP performed by resident/CRNA in presence of a teaching physician.    Location: OB and OR  Procedure Start/Stop Times:      4/9/2019 10:35 AM     4/9/2019 10:40 AM    patient identified, IV checked, site marked, risks and benefits discussed, informed consent, monitors and equipment checked and pre-op evaluation      Correct Patient: Yes      Correct Procedure: Yes    Procedure:     Procedure:  Intrathecal    ASA:  2    Position:  Sitting    Sterile Prep: chloraprep, mask, sterile gloves and patient draped      Insertion site:  L3-4    Approach:  Midline    Needle Type:  Lianne    Needle gauge (G):  25    Local Skin Infiltration:  1% lidocaine    amount (ml):  3    Needle Length (in):  3.5    Introducer used: Yes      Introducer gauge:  20 G    Attempts:  1    Redirects:  3    CSF:  Clear    Paresthesias:  No    Time injected:  10:39

## 2019-04-10 NOTE — ANESTHESIA POSTPROCEDURE EVALUATION
Anesthesia POST Procedure Evaluation    Patient: Satinder Avalos   MRN:     [id number] Gender:   female   Age:    28 year old :      1990        Preoperative Diagnosis: History Of  Labor   Procedure(s):  CERCLAGE CERVICAL   Postop Comments: No value filed.       Anesthesia Type:  Regional, MAC    Reportable Event: NO     PAIN: Uncomplicated   Sign Out status: Comfortable, Well controlled pain     PONV: No PONV   Sign Out status:  No Nausea or Vomiting     Neuro/Psych: Uneventful perioperative course   Sign Out Status: Preoperative baseline; Age appropriate mentation     Airway/Resp.: Uneventful perioperative course   Sign Out Status: Non labored breathing, age appropriate RR; Resp. Status within EXPECTED Parameters     CV: Uneventful perioperative course   Sign Out status: Appropriate BP and perfusion indices; Appropriate HR/Rhythm     Disposition:   Sign Out in:  PACU  Disposition:  Phase II; Home  Recovery Course: Uneventful  Follow-Up: Not required           Last Anesthesia Record Vitals:  CRNA VITALS  2019 1101 - 2019 1201      2019             Pulse:  98    SpO2:  100 %          Last PACU Vitals:  Vitals Value Taken Time   /78 2019  4:31 PM   Temp 37  C (98.6  F) 2019  4:30 PM   Pulse 80 2019 12:35 PM   Resp 18 2019  5:00 PM   SpO2 100 % 2019  5:00 PM   Temp src     NIBP     Pulse     SpO2     Resp     Temp     Ht Rate     Temp 2           Electronically Signed By: Lucy Meek MD, April 10, 2019, 10:38 AM

## 2019-04-12 ENCOUNTER — TELEPHONE (OUTPATIENT)
Dept: OBGYN | Facility: CLINIC | Age: 29
End: 2019-04-12

## 2019-04-12 ENCOUNTER — MYC MEDICAL ADVICE (OUTPATIENT)
Dept: OBGYN | Facility: CLINIC | Age: 29
End: 2019-04-12

## 2019-04-12 NOTE — TELEPHONE ENCOUNTER
PRIOR AUTHORIZATION DENIED    Medication: Hydroxyprogesterone    Denial Date: 4/12/2019    Denial Rational: Buy and bill per [place] Medicaid    JCode:

## 2019-04-12 NOTE — TELEPHONE ENCOUNTER
Pt called to discuss shots in message below she is supposed to receive. Please call her to discuss.

## 2019-04-16 NOTE — TELEPHONE ENCOUNTER
Hansa received and placed in our stock for patient in [place]    Gema Reed  Women's Health           Z Plasty Text: The lesion was extirpated to the level of the fat with a #15 scalpel blade.  Given the location of the defect, shape of the defect and the proximity to free margins a Z-plasty was deemed most appropriate for repair.  Using a sterile surgical marker, the appropriate transposition arms of the Z-plasty were drawn incorporating the defect and placing the expected incisions within the relaxed skin tension lines where possible.    The area thus outlined was incised deep to adipose tissue with a #15 scalpel blade.  The skin margins were undermined to an appropriate distance in all directions utilizing iris scissors.  The opposing transposition arms were then transposed into place in opposite direction and anchored with interrupted buried subcutaneous sutures.

## 2019-04-18 ENCOUNTER — PRENATAL OFFICE VISIT (OUTPATIENT)
Dept: OBGYN | Facility: CLINIC | Age: 29
End: 2019-04-18
Payer: MEDICAID

## 2019-04-18 VITALS
SYSTOLIC BLOOD PRESSURE: 117 MMHG | WEIGHT: 175.6 LBS | BODY MASS INDEX: 31.23 KG/M2 | DIASTOLIC BLOOD PRESSURE: 70 MMHG | HEART RATE: 78 BPM

## 2019-04-18 DIAGNOSIS — O09.92 SUPERVISION OF HIGH RISK PREGNANCY, ANTEPARTUM, SECOND TRIMESTER: Primary | ICD-10-CM

## 2019-04-18 PROCEDURE — 99212 OFFICE O/P EST SF 10 MIN: CPT | Mod: 25 | Performed by: OBSTETRICS & GYNECOLOGY

## 2019-04-18 PROCEDURE — 96372 THER/PROPH/DIAG INJ SC/IM: CPT | Performed by: OBSTETRICS & GYNECOLOGY

## 2019-04-18 RX ADMIN — HYDROXYPROGESTERONE CAPROATE 250 MG: 250 INJECTION INTRAMUSCULAR at 15:36

## 2019-04-18 NOTE — PROGRESS NOTES
Prior to injection, verified patient identity using patient's name and date of birth.  Due to injection administration, patient instructed to remain in clinic for 15 minutes  afterwards, and to report any adverse reaction to me immediately.    Maranda 250 mg/1 mL    Drug Amount Wasted:  None.  Vial/Syringe: Single dose vial  Expiration Date:  07/2020    The following medication was given:     MEDICATION: Hydroxyprogesterone Caproate 250mg/mL in oil ([place])  ROUTE: IM  SITE: Ventrogluteal - Right  DOSE: 250 mg/1 mL  LOT #: [id number]  :  American [place]  EXPIRATION DATE:  07/2020  NDC: 4666-4894-50    [place] was from clinic stock.    Nicolasa Rodriguez RN on 4/18/2019 at 3:39 PM

## 2019-04-18 NOTE — PROGRESS NOTES
She has felt fetal movement and denies any vaginal spotting/bleeding or uterine contractions.  She received her Hansa injection today and will make an appt for the next one in 7 days.  She has her next cervical length OB US scheduled tomorrow with MFM.  She denies any fluid leakage or regular uterine contractions.

## 2019-04-18 NOTE — TELEPHONE ENCOUNTER
Patient is scheduled today to see Dr. Obregon and to get her first Hansa.  Ana Stringer, [facility]  April 18, 2019 2:50 PM

## 2019-04-18 NOTE — PATIENT INSTRUCTIONS
If you have any questions regarding your visit, Please contact your care team.    [employer]: [phone]      [facility] HOURS TELEPHONE NUMBER   Elle DO Mindi.    CARL Perez -    CARLOS Zee       Monday, Wednesday, Thursday and Friday, [place]  8:30a.m-5:00 p.m   [facility]  [address] [place] [address]  [phone] ask for [facility]    Imaging [phone]       Urgent Care locations:    [facility] Saturday and Sunday   9 am - 5 pm    Monday-Friday   12 pm - 8 pm  Saturday and Sunday   9 am - 5 pm   [phone]     [facility] Labor and Delivery:  [phone]    If you need a medication refill, please contact your pharmacy. Please allow 3 business days for your refill to be completed.  As always, Thank you for trusting us with your healthcare needs!

## 2019-04-19 ENCOUNTER — TRANSFERRED RECORDS (OUTPATIENT)
Dept: HEALTH INFORMATION MANAGEMENT | Facility: CLINIC | Age: 29
End: 2019-04-19

## 2019-04-23 ENCOUNTER — TELEPHONE (OUTPATIENT)
Dept: OBGYN | Facility: CLINIC | Age: 29
End: 2019-04-23

## 2019-04-23 NOTE — TELEPHONE ENCOUNTER
Forms received and filled out will fax put once provider signs.    Gema Reed  Women's Health

## 2019-04-26 ENCOUNTER — ALLIED HEALTH/NURSE VISIT (OUTPATIENT)
Dept: NURSING | Facility: CLINIC | Age: 29
End: 2019-04-26
Payer: MEDICAID

## 2019-04-26 DIAGNOSIS — O09.891 HISTORY OF PRETERM DELIVERY, CURRENTLY PREGNANT IN FIRST TRIMESTER: Primary | ICD-10-CM

## 2019-04-26 PROCEDURE — 96372 THER/PROPH/DIAG INJ SC/IM: CPT

## 2019-04-26 PROCEDURE — 99207 ZZC NO CHARGE NURSE ONLY: CPT

## 2019-04-26 RX ADMIN — HYDROXYPROGESTERONE CAPROATE 250 MG: 250 INJECTION INTRAMUSCULAR at 14:50

## 2019-04-26 NOTE — PROGRESS NOTES
Prior to injection, verified patient identity using patient's name and date of birth.  Due to injection administration, patient instructed to remain in clinic for 15 minutes  afterwards, and to report any adverse reaction to me immediately.    Maranda    Drug Amount Wasted:  None.  Vial/Syringe: Single dose vial  Expiration Date:  07/2020      The following medication was given:     MEDICATION: Hydroxyprogesterone Caproate 250mg/mL in oil ([place])  ROUTE: IM  SITE: Ventrogluteal - Left  DOSE: 250 mg/1 mL  LOT #: [id number]  :  American [place]  EXPIRATION DATE:  07/2020  NDC: 7377-2503-45      Clinic Supplied Maranda Injection.     Patient tolerated injection well. No complaints.   Nicolasa Rodriguez RN on 4/26/2019 at 2:53 PM

## 2019-05-02 ENCOUNTER — ALLIED HEALTH/NURSE VISIT (OUTPATIENT)
Dept: NURSING | Facility: CLINIC | Age: 29
End: 2019-05-02
Payer: COMMERCIAL

## 2019-05-02 DIAGNOSIS — O09.212 HISTORY OF PRETERM LABOR, CURRENT PREGNANCY, SECOND TRIMESTER: Primary | ICD-10-CM

## 2019-05-02 PROCEDURE — 96372 THER/PROPH/DIAG INJ SC/IM: CPT

## 2019-05-02 PROCEDURE — 99207 ZZC NO CHARGE NURSE ONLY: CPT

## 2019-05-02 RX ADMIN — HYDROXYPROGESTERONE CAPROATE 250 MG: 250 INJECTION INTRAMUSCULAR at 09:12

## 2019-05-02 NOTE — PROGRESS NOTES
Prior to injection, verified patient identity using patient's name and date of birth.  Due to injection administration, patient instructed to remain in clinic for 15 minutes  afterwards, and to report any adverse reaction to me immediately.    Hydroxyprogesterone Caproate Inj ([place])    Drug Amount Wasted:  None.  Vial/Syringe: Single dose vial  Expiration Date:  07/2020    The following medication was given:     MEDICATION: Hydroxyprogesterone Caproate 250mg/mL in oil ([place])  ROUTE: IM  SITE: Ventrogluteal - Right  DOSE: 250 mg/1 mL   LOT #: [id number]  :  American [place]  EXPIRATION DATE:  07/2020  NDC: 6539-0904-79    Clinic Supplied [place] Injection.      Patient tolerated injection well. No complaints. Instructed her to stay in lobby for 15 minutes. Scheduled next injection    Nicolasa Rodriguez RN on 5/2/2019 at 9:16 AM

## 2019-05-02 NOTE — PROGRESS NOTES
More Maranda medication ordered today by clinic staff. With delivery tentatively scheduled for 5/7/2019. Clinic supplies injection for patient and she is billed for services. Patient has 2 doses (Single Dose Vials) remaining.     Nicolasa Rodriguez RN on 5/2/2019 at 10:00 AM

## 2019-05-06 ENCOUNTER — TRANSFERRED RECORDS (OUTPATIENT)
Dept: HEALTH INFORMATION MANAGEMENT | Facility: CLINIC | Age: 29
End: 2019-05-06

## 2019-05-09 ENCOUNTER — ALLIED HEALTH/NURSE VISIT (OUTPATIENT)
Dept: NURSING | Facility: CLINIC | Age: 29
End: 2019-05-09
Payer: COMMERCIAL

## 2019-05-09 DIAGNOSIS — O09.212 HISTORY OF PRETERM LABOR, CURRENT PREGNANCY, SECOND TRIMESTER: Primary | ICD-10-CM

## 2019-05-09 PROCEDURE — 99207 ZZC NO CHARGE NURSE ONLY: CPT

## 2019-05-09 PROCEDURE — 96372 THER/PROPH/DIAG INJ SC/IM: CPT

## 2019-05-09 RX ADMIN — HYDROXYPROGESTERONE CAPROATE 250 MG: 250 INJECTION INTRAMUSCULAR at 10:10

## 2019-05-09 NOTE — PROGRESS NOTES
5/9/2019   Pt's clinic supplied Hydroxyprogesterone Caproate ([place]) 250mg/ml arrived.    Colleen Coronel RN on 5/9/2019 at 10:35 AM

## 2019-05-09 NOTE — PROGRESS NOTES
Prior to injection, verified patient identity using patient's name and date of birth.  Due to injection administration, patient instructed to remain in clinic for 15 minutes  afterwards, and to report any adverse reaction to me immediately.    Hydroxyprogesterone Caproate 250 mg/mL    Drug Amount Wasted:  None.  Vial/Syringe: Single dose vial  Expiration Date:  07/2020    The following medication was given:     MEDICATION: Hydroxyprogesterone Caproate 250mg/mL in oil ([place])  ROUTE: IM  SITE: Ventrogluteal - Left  DOSE: 250mg/1mL  LOT #: [id number]  :  American [place]  EXPIRATION DATE:  07/2020  NDC: 7469-7100-39    Injection was provided by: Clinic.    Patient tolerated injection well, denies complications. Was informed to wait in lobby for 15 minutes to monitor for adverse reaction. Patient verbalized understanding, she had no further concerns or questions at this time. Next injection scheduled.     Nicolasa Rodriguez RN on 5/9/2019 at 10:13 AM

## 2019-05-16 ENCOUNTER — PRENATAL OFFICE VISIT (OUTPATIENT)
Dept: OBGYN | Facility: CLINIC | Age: 29
End: 2019-05-16
Payer: COMMERCIAL

## 2019-05-16 VITALS
WEIGHT: 183 LBS | BODY MASS INDEX: 32.55 KG/M2 | HEART RATE: 93 BPM | SYSTOLIC BLOOD PRESSURE: 111 MMHG | OXYGEN SATURATION: 100 % | DIASTOLIC BLOOD PRESSURE: 74 MMHG

## 2019-05-16 DIAGNOSIS — O09.891 HISTORY OF PRETERM DELIVERY, CURRENTLY PREGNANT IN FIRST TRIMESTER: ICD-10-CM

## 2019-05-16 DIAGNOSIS — N87.1 DYSPLASIA OF CERVIX, HIGH GRADE CIN 2: ICD-10-CM

## 2019-05-16 DIAGNOSIS — O09.892 HISTORY OF PRETERM DELIVERY, CURRENTLY PREGNANT IN SECOND TRIMESTER: Primary | ICD-10-CM

## 2019-05-16 PROCEDURE — 99207 ZZC PRENATAL VISIT: CPT | Performed by: OBSTETRICS & GYNECOLOGY

## 2019-05-16 PROCEDURE — 96372 THER/PROPH/DIAG INJ SC/IM: CPT | Performed by: OBSTETRICS & GYNECOLOGY

## 2019-05-16 RX ADMIN — HYDROXYPROGESTERONE CAPROATE 250 MG: 250 INJECTION INTRAMUSCULAR at 16:32

## 2019-05-16 NOTE — PROGRESS NOTES
Prior to injection, verified patient identity using patient's name and date of birth.  Due to injection administration, patient instructed to remain in clinic for 15 minutes  afterwards, and to report any adverse reaction to me immediately.    Hydroxyprogesterone caproate 250 mg/1mL    Drug Amount Wasted:  None.  Vial/Syringe: Single dose vial  Expiration Date:  09/2020    The following medication was given:     MEDICATION: Hydroxyprogesterone Caproate 250mg/mL in oil ([place])  ROUTE: IM  SITE: Ventrogluteal - Right  DOSE: 250mg/1mL  LOT #: [id number]  :  American [place]  EXPIRATION DATE:  09/2020  NDC: 8832-7132-46    Injection was provided by: CLINIC    Patient tolerated injection well, denies complications. Was informed to wait in lobby for 15 minutes to monitor for adverse reaction. Patient verbalized understanding, she had no further concerns or questions at this time. Next 4 injections were scheduled.     Nicolasa Rodriguez RN on 5/16/2019 at 4:35 PM

## 2019-05-16 NOTE — PROGRESS NOTES
"She reports feeling reassuring daily fetal activity and will continue to record.  She denies any fluid leakage or regular uterine contractions.  She is due for her next [place] injection today so this was provided.  Her last US result per [facility] on 5/6/19 showed normal cervical length and her cerclage is in place.  The patient declines a PPTL since she may want to have a third child.  We discussed her recent pap and DNA reflex results and she declines to schedule colposcopy until 6 weeks after delivery.  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  "

## 2019-05-23 ENCOUNTER — ALLIED HEALTH/NURSE VISIT (OUTPATIENT)
Dept: NURSING | Facility: CLINIC | Age: 29
End: 2019-05-23
Payer: COMMERCIAL

## 2019-05-23 DIAGNOSIS — O09.212 HISTORY OF PRETERM LABOR, CURRENT PREGNANCY, SECOND TRIMESTER: Primary | ICD-10-CM

## 2019-05-23 PROCEDURE — 96372 THER/PROPH/DIAG INJ SC/IM: CPT

## 2019-05-23 PROCEDURE — 99207 ZZC NO CHARGE NURSE ONLY: CPT

## 2019-05-23 RX ADMIN — HYDROXYPROGESTERONE CAPROATE 250 MG: 250 INJECTION INTRAMUSCULAR at 09:12

## 2019-05-23 NOTE — PROGRESS NOTES
Prior to injection, verified patient identity using patient's name and date of birth.  Due to injection administration, patient instructed to remain in clinic for 15 minutes  afterwards, and to report any adverse reaction to me immediately.    Hydroxyprogesterone Caproate 250 mg/1 mL    Drug Amount Wasted:  None.  Vial/Syringe: Single dose vial  Expiration Date:  07/2020    The following medication was given:     MEDICATION: Hydroxyprogesterone Caproate 250mg/mL in oil ([place])  ROUTE: IM  SITE: Ventrogluteal - Left  DOSE: 250 mg/1mL  LOT #: [id number]  :  American [place]  EXPIRATION DATE:  07/2020  NDC: 1567-2482-57    Injection was provided by: CLINIC    Patient tolerated injection well, denies complications. Was informed to wait in lobby for 15 minutes to monitor for adverse reaction. Patient verbalized understanding, she had no further concerns or questions at this time. Next injection already scheduled.     Nicolasa Rodriguez RN on 5/23/2019 at 9:14 AM

## 2019-05-30 ENCOUNTER — ALLIED HEALTH/NURSE VISIT (OUTPATIENT)
Dept: NURSING | Facility: CLINIC | Age: 29
End: 2019-05-30
Payer: COMMERCIAL

## 2019-05-30 DIAGNOSIS — O09.212 HISTORY OF PRETERM LABOR, CURRENT PREGNANCY, SECOND TRIMESTER: Primary | ICD-10-CM

## 2019-05-30 PROCEDURE — 99207 ZZC NO CHARGE NURSE ONLY: CPT

## 2019-05-30 PROCEDURE — 96372 THER/PROPH/DIAG INJ SC/IM: CPT

## 2019-05-30 RX ADMIN — HYDROXYPROGESTERONE CAPROATE 250 MG: 250 INJECTION INTRAMUSCULAR at 09:14

## 2019-05-30 NOTE — PROGRESS NOTES
Prior to injection, verified patient identity using patient's name and date of birth.  Due to injection administration, patient instructed to remain in clinic for 15 minutes  afterwards, and to report any adverse reaction to me immediately.    Hydroxyprogesterone Caproate 250mg/1mL    Drug Amount Wasted:  None.  Vial/Syringe: Single dose vial  Expiration Date:  09/2020      The following medication was given:     MEDICATION: Hydroxyprogesterone Caproate 250mg/mL in oil ([place])  ROUTE: IM  SITE: Ventrogluteal - Right  DOSE: 250mg/1mL  LOT #: [id number]  :  American [place]  EXPIRATION DATE:  09/2020  NDC: 1386-6262-81    Injection was provided by: CLINIC    Patient tolerated injection well, denies complications. Was informed to wait in lobby for 15 minutes to monitor for adverse reaction. Patient verbalized understanding, she had no further concerns or questions at this time. Next injection scheduled.     Nicolasa Rodriguez RN on 5/30/2019 at 9:16 AM

## 2019-06-06 ENCOUNTER — ALLIED HEALTH/NURSE VISIT (OUTPATIENT)
Dept: NURSING | Facility: CLINIC | Age: 29
End: 2019-06-06
Payer: COMMERCIAL

## 2019-06-06 DIAGNOSIS — O09.212 HISTORY OF PRETERM LABOR, CURRENT PREGNANCY, SECOND TRIMESTER: Primary | ICD-10-CM

## 2019-06-06 PROCEDURE — 96372 THER/PROPH/DIAG INJ SC/IM: CPT

## 2019-06-06 PROCEDURE — 99207 ZZC NO CHARGE NURSE ONLY: CPT

## 2019-06-06 RX ADMIN — HYDROXYPROGESTERONE CAPROATE 250 MG: 250 INJECTION INTRAMUSCULAR at 09:12

## 2019-06-06 NOTE — PROGRESS NOTES
Prior to injection, verified patient identity using patient's name and date of birth.  Due to injection administration, patient instructed to remain in clinic for 15 minutes  afterwards, and to report any adverse reaction to me immediately.    Hydroxyprogesterone Caproate 250 mg/1mL    Drug Amount Wasted:  None.  Vial/Syringe: Single dose vial  Expiration Date:  07/2020      The following medication was given:     MEDICATION: Hydroxyprogesterone Caproate 250mg/mL in oil ([place])  ROUTE: IM  SITE: Ventrogluteal - Left  DOSE: 250mg/1mL  LOT #: [id number]  :  American [place]  EXPIRATION DATE:  07/2020  NDC: 5004-5143-56    Injection was provided by: CLINIC    Patient tolerated injection well, denies complications. Was informed to wait in lobby for 15 minutes to monitor for adverse reaction. Patient verbalized understanding, she had no further concerns or questions at this time. Next injection scheduled for Friday next week, has office visit and GCT. Review GCT instructions with patient as well.     Nicolasa Rodriguez RN on 6/6/2019 at 9:24 AM

## 2019-06-11 ENCOUNTER — DOCUMENTATION ONLY (OUTPATIENT)
Dept: LAB | Facility: CLINIC | Age: 29
End: 2019-06-11

## 2019-06-11 DIAGNOSIS — N76.0 BACTERIAL VAGINOSIS: ICD-10-CM

## 2019-06-11 DIAGNOSIS — O09.212 HISTORY OF PRETERM LABOR, CURRENT PREGNANCY, SECOND TRIMESTER: Primary | ICD-10-CM

## 2019-06-11 DIAGNOSIS — B96.89 BACTERIAL VAGINOSIS: ICD-10-CM

## 2019-06-12 NOTE — PROGRESS NOTES
1 hour gtt orders have been placed.  Ana Stringer, [facility]  June 12, 2019 7:42 AM

## 2019-06-14 ENCOUNTER — TELEPHONE (OUTPATIENT)
Dept: OBGYN | Facility: CLINIC | Age: 29
End: 2019-06-14

## 2019-06-14 ENCOUNTER — PRENATAL OFFICE VISIT (OUTPATIENT)
Dept: OBGYN | Facility: CLINIC | Age: 29
End: 2019-06-14
Payer: COMMERCIAL

## 2019-06-14 VITALS
HEART RATE: 98 BPM | WEIGHT: 192.6 LBS | SYSTOLIC BLOOD PRESSURE: 123 MMHG | OXYGEN SATURATION: 99 % | BODY MASS INDEX: 34.25 KG/M2 | DIASTOLIC BLOOD PRESSURE: 81 MMHG

## 2019-06-14 DIAGNOSIS — O09.212 SUPERVISION OF PREGNANCY WITH HISTORY OF PRE-TERM LABOR IN SECOND TRIMESTER: Primary | ICD-10-CM

## 2019-06-14 DIAGNOSIS — O09.212 HISTORY OF PRETERM LABOR, CURRENT PREGNANCY, SECOND TRIMESTER: ICD-10-CM

## 2019-06-14 DIAGNOSIS — O24.410 DIET CONTROLLED GESTATIONAL DIABETES MELLITUS (GDM), ANTEPARTUM: ICD-10-CM

## 2019-06-14 DIAGNOSIS — O24.410 DIET CONTROLLED GESTATIONAL DIABETES MELLITUS (GDM) IN SECOND TRIMESTER: Primary | ICD-10-CM

## 2019-06-14 LAB
GLUCOSE 1H P 50 G GLC PO SERPL-MCNC: 217 MG/DL (ref 60–129)
HGB BLD-MCNC: 12.9 G/DL (ref 11.7–15.7)

## 2019-06-14 PROCEDURE — 99207 ZZC NO CHARGE LOS: CPT

## 2019-06-14 PROCEDURE — 99207 ZZC PRENATAL VISIT: CPT | Performed by: OBSTETRICS & GYNECOLOGY

## 2019-06-14 PROCEDURE — 96372 THER/PROPH/DIAG INJ SC/IM: CPT | Performed by: OBSTETRICS & GYNECOLOGY

## 2019-06-14 PROCEDURE — 36415 COLL VENOUS BLD VENIPUNCTURE: CPT | Performed by: OBSTETRICS & GYNECOLOGY

## 2019-06-14 PROCEDURE — 00000218 ZZHCL STATISTIC OBHBG - HEMOGLOBIN: Performed by: OBSTETRICS & GYNECOLOGY

## 2019-06-14 PROCEDURE — 82950 GLUCOSE TEST: CPT | Performed by: OBSTETRICS & GYNECOLOGY

## 2019-06-14 RX ADMIN — HYDROXYPROGESTERONE CAPROATE 250 MG: 250 INJECTION INTRAMUSCULAR at 08:59

## 2019-06-14 NOTE — PROGRESS NOTES
She reports feeling reassuring daily fetal activity and will continue to record.  She gained 9 lbs since her last visit and so we discussed eating a healthier diet.  She denies any fluid leakage or regular uterine contractions.  Will check her 1-hour GTT today with hgb screen.  She is not a rhogam candidate since her Rh factor is present.  She received her [place] injection today and no longer needs cervical exams with US since she is currently asymptomatic.

## 2019-06-14 NOTE — TELEPHONE ENCOUNTER
Pt viewed her result note below on her mychart and had some questions:  Viewed by Satinder Avalos on 6/14/2019 12:09 PM   Written by Elle Obregon DO on 6/14/2019 11:18 AM   Your glucose test was abnormally elevated at 217 so you have gestational diabetes and do not need the 3-hour test.  Therefore, the next step is a referral to the diabetic educator so please make this appointment.  Your hemoglobin value was normal.       I explained that due to her glucose number from the 1 hour glucose this shows that she does have gestational diabetes and she doesn't have to do the definitive 3 hour testing.  Dr. Obregon would like pt to just schedule an appt with a diabetic educator to discuss further and come up with a treatment plan for the rest of her pregnancy.  I gave pt the diabetic educator's scheduling number.  Pt verbalized understanding and agreed to plan.    Saadia Barber RN

## 2019-06-14 NOTE — PROGRESS NOTES
Prior to injection, verified patient identity using patient's name and date of birth.  Due to injection administration, patient instructed to remain in clinic for 15 minutes  afterwards, and to report any adverse reaction to me immediately.    Hydroxyprogesterone Caproate 250 mg/1mL    Drug Amount Wasted:  None.  Vial/Syringe: Single dose vial  Expiration Date:  09/2020    The following medication was given:     MEDICATION: Hydroxyprogesterone Caproate 250mg/mL in oil ([place])  ROUTE: IM  SITE: Ventrogluteal - Right  DOSE: 250mg/1mL  LOT #: [id number]  :  American [place]  EXPIRATION DATE:  09/2020  NDC: 0279-1466-28    Injection was provided by: CLINIC    Patient tolerated injection well, denies complications. Patient here for Established Prenatal appt and will be in exam room for 15 minutes if reaction were to occur.     Patient verbalized understanding, she had no further concerns or questions at this time.     Nicolasa Rodriguez RN on 6/14/2019 at 9:01 AM

## 2019-06-14 NOTE — PATIENT INSTRUCTIONS
If you have any questions regarding your visit, Please contact your care team.    Women s Health CLINIC HOURS TELEPHONE NUMBER   Elle WelshDO marco.    CARL Perez -    [place] GONZALEZ       Monday, Wednesday, Thursday and Friday, [place]  8:30a.m-5:00 p.m   [facility]  [address] [place] [address]  [phone] ask for [facility]    Imaging [phone]       Urgent Care locations:    [facility] Saturday and Sunday   9 am - 5 pm    Monday-Friday   12 pm - 8 pm  Saturday and Sunday   9 am - 5 pm   [phone]     [facility] Labor and Delivery:  [phone]    If you need a medication refill, please contact your pharmacy. Please allow 3 business days for your refill to be completed.  As always, Thank you for trusting us with your healthcare needs!

## 2019-06-17 ENCOUNTER — E-VISIT (OUTPATIENT)
Dept: OBGYN | Facility: CLINIC | Age: 29
End: 2019-06-17
Payer: COMMERCIAL

## 2019-06-17 ENCOUNTER — TELEPHONE (OUTPATIENT)
Dept: OBGYN | Facility: CLINIC | Age: 29
End: 2019-06-17

## 2019-06-17 DIAGNOSIS — Z53.9 ERRONEOUS ENCOUNTER--DISREGARD: Primary | ICD-10-CM

## 2019-06-17 NOTE — TELEPHONE ENCOUNTER
Patient calling today to find out what she can take for hives. Patient believes she is having an allergic reaction to something, hives started on feet last week and have spread to different areas of her body. Patient will be going to urgent care today. Advised her that plain Benadryl is safe to take but she shouldn't take it before driving to Urgent Care. She denied breathing complications, just complains of itching, but it was bad enough last night that she couldn't sleep.     Nicolasa Rodriguez RN on 6/17/2019 at 11:13 AM

## 2019-06-20 ENCOUNTER — ALLIED HEALTH/NURSE VISIT (OUTPATIENT)
Dept: NURSING | Facility: CLINIC | Age: 29
End: 2019-06-20
Payer: COMMERCIAL

## 2019-06-20 DIAGNOSIS — O09.212 HISTORY OF PRETERM LABOR, CURRENT PREGNANCY, SECOND TRIMESTER: Primary | ICD-10-CM

## 2019-06-20 PROCEDURE — 99207 ZZC NO CHARGE NURSE ONLY: CPT

## 2019-06-20 PROCEDURE — 96372 THER/PROPH/DIAG INJ SC/IM: CPT

## 2019-06-20 RX ADMIN — HYDROXYPROGESTERONE CAPROATE 250 MG: 250 INJECTION INTRAMUSCULAR at 10:11

## 2019-06-20 NOTE — PROGRESS NOTES
Prior to injection, verified patient identity using patient's name and date of birth.  Due to injection administration, patient instructed to remain in clinic for 15 minutes  afterwards, and to report any adverse reaction to me immediately. Clinic supplied.     Hydroxyprogesterone Caproate [place]    Drug Amount Wasted:  None.  Vial/Syringe: Single dose vial  Expiration Date:  07/2020  Clinic supplied    The following medication was given:     MEDICATION: Hydroxyprogesterone Caproate 250mg/mL in oil ([place])  ROUTE: IM  SITE: Ventrogluteal - Left  DOSE: 250mg/1ml  LOT #: [id number]  :  American [place]  EXPIRATION DATE:  07/2020  NDC: 9454-5981-83     Injection was provided by: Colleen Coronel RN     Patient tolerated injection well, denies complications. Was informed to wait in lobby for 15 minutes to monitor for adverse reaction. Patient verbalized understanding, she had no further concerns or questions at this time. Next injection scheduled.     Colleen Coronel RN on 6/20/2019 at 10:09 AM

## 2019-06-26 ENCOUNTER — ALLIED HEALTH/NURSE VISIT (OUTPATIENT)
Dept: EDUCATION SERVICES | Facility: CLINIC | Age: 29
End: 2019-06-26
Payer: COMMERCIAL

## 2019-06-26 DIAGNOSIS — O24.410 DIET CONTROLLED GESTATIONAL DIABETES MELLITUS (GDM) IN SECOND TRIMESTER: Primary | ICD-10-CM

## 2019-06-26 PROCEDURE — G0109 DIAB MANAGE TRN IND/GROUP: HCPCS

## 2019-06-26 RX ORDER — BLOOD-GLUCOSE METER
EACH MISCELLANEOUS
Qty: 1 KIT | Refills: 0 | COMMUNITY
Start: 2019-06-26 | End: 2019-07-16

## 2019-06-26 NOTE — GROUP NOTE
Diabetes Diabetes Self-Management Education & Support    [place] [place]  Start and End Time  Start Time: 0800  End Time: 0915    SUBJECTIVE / OBJECTIVE  Cultural Influences/Ethnic Background:  American    Estimated Date of Delivery: Sep 17, 2019    1 hour OGTT  Lab Results   Component Value Date    GLU1 217 (H) 2019       3 hour OGTT    Fasting  No results found for: GLF    1 hour  No results found for: GL1    2 hour  No results found for: GL2    3 hour  No results found for: GL3    Lifestyle and Health Behaviors:  Pre-pregnancy weight (lbs): (P) 175  How intense was your typical exercise? : (P) Light (like stretching or slow walking)  Barrier to exercise: (P) None  Meals include: (P) Breakfast, Lunch, Dinner, Snacks  Beverages: (P) Water, Milk, Juice, Soda  Cultural/Orthodoxy diet restrictions?: (P) No  Biggest challenges to healthy eating: (P) Eating out, Evening snacking  Pre-[place] vitamin?: (P) Yes  Supplements?: (P) No  Experiencing nausea?: (P) No    Healthy Coping:  Informal Support system:: (P) Children, [place] based, Family, Friends, Significant other    Current Management:       ASSESSMENT:  Reviewed target blood glucose values, sharps disposal, diagnosis criteria for GDM and importance of good blood glucose management for health of mom and baby. Patient advised to call if 3 blood glucose elevated before returns next week. Instructed on ketone checking and told to call if unable to get ketones negative.     Discussed carbohydrate sources and impact on blood glucose. Reviewed basics of healthy eating and incorporating a variety of foods into meal plan. Instructed on carbohydrate counting and label reading and recommended patient consume 2-3 CHO for breakfast, 3-4 CHO for lunch and dinner and 1-2 CHO for each snack, 3 snacks a day. This meal plan will provide 11-17 CHO/day so informed patient to call if struggling since may be able to adjust meal plan if needed.  Used food  models to help demonstrate portion control and suggested plate method to balance meals. Discussed importance of not going too low in CHO since that may cause liver to produce excess glucose and contribute to elevated blood glucose readings and ketone formation. Encouraged eating breakfast within 1 hour of waking.  To also help prevent against ketone formation, protein was encouraged with meals and snacks, especially with the night snack. Reviewed benefits of exercising to help lower blood glucose and encouraged 30 minutes a day as tolerated and per MD approval, and to target exercise after meals if feel consumed too many CHO or having problem with BG being elevated after a meal. Pt verbalized understanding of concepts discussed and recommendations provided.    Estimated Energy Needs: 2327 kcal/day (11-17 CHO)        INTERVENTION:  Patient was instructed on One Touch Verio Flex meter and was able to provide an accurate return demonstration. Patient's blood glucose reading today was 99 mg/dL (fasting).    Demonstrated use of insulin pen and vial and syringe    Gestational Diabetes Self-Management Education & Support    Educational topics covered today:  GDM diagnosis, pathophysiology, Risks and Complications of GDM, Means of controlling GDM, Using a Blood Glucose Monitor, Blood Glucose Goals, Logging and Interpreting Glucose Results, Ketone Testing, When to Call a Diabetes Educator or OB Provider, Healthy Eating During Pregnancy, Counting Carbohydrates, Meal Planning for GDM, and Physical Activity    Educational materials provided today:   [place] Understanding Gestational Diabetes  GDM Log Book  Sharps Disposal  Care After Delivery  Pt verbalized understanding of concepts discussed and recommendations provided today.     PLAN:  Check glucose 4 times daily, before breakfast and 1 hour after each meal.     Check Ketones daily for one week, if negative, reduce testing to once a week.     Follow consistent CHO meal  plan, eat CHO and protein/fat at all meals/snacks.    Call/e-mail/MyChart message diabetes educator if 3 or more blood sugars are above the goal in 1 week or if ketones are positive or with questions/concerns.        Physical activity recommended: 10-15 minutes after meals.    Meal plan: 2-3 carbs at breakfast, 3-4 carbs at lunch, 3-4 carbs at supper, 1-2 carbs at 3 snacks a day.    Diabetes Educator:  Sosa Lebron RD

## 2019-06-28 ENCOUNTER — PRENATAL OFFICE VISIT (OUTPATIENT)
Dept: OBGYN | Facility: CLINIC | Age: 29
End: 2019-06-28
Payer: COMMERCIAL

## 2019-06-28 VITALS
WEIGHT: 195.5 LBS | HEART RATE: 104 BPM | OXYGEN SATURATION: 98 % | SYSTOLIC BLOOD PRESSURE: 125 MMHG | DIASTOLIC BLOOD PRESSURE: 86 MMHG | BODY MASS INDEX: 34.77 KG/M2

## 2019-06-28 DIAGNOSIS — O09.213 HISTORY OF PRETERM LABOR, CURRENT PREGNANCY, THIRD TRIMESTER: Primary | ICD-10-CM

## 2019-06-28 PROCEDURE — 99207 ZZC PRENATAL VISIT: CPT | Performed by: OBSTETRICS & GYNECOLOGY

## 2019-06-28 PROCEDURE — 96372 THER/PROPH/DIAG INJ SC/IM: CPT | Performed by: OBSTETRICS & GYNECOLOGY

## 2019-06-28 RX ADMIN — HYDROXYPROGESTERONE CAPROATE 250 MG: 250 INJECTION INTRAMUSCULAR at 09:05

## 2019-06-28 NOTE — PROGRESS NOTES
Prior to injection, verified patient identity using patient's name and date of birth.  Due to injection administration, patient instructed to remain in clinic for 15 minutes  afterwards, and to report any adverse reaction to me immediately.CLINIC SUPPLIED    Hydroxyprogesterone Caproate Maranda    Drug Amount Wasted:  None.  Vial/Syringe: Single dose vial  Expiration Date:      The following medication was given:     MEDICATION: Hydroxyprogesterone Caproate 250mg/mL in oil ([place])  ROUTE: IM  SITE: Ventrogluteal - Right  DOSE: 250mg/1ml  LOT #: [id number]  :  American [place]  EXPIRATION DATE:    NDC: 4986-2855-96    Injection was provided by: Colleen Coronel RN    Patient tolerated injection well, denies complications. Was informed to wait in lobby for 15 minutes to monitor for adverse reaction. Patient verbalized understanding, she had no further concerns or questions at this time. Next injection scheduled. CLINIC SUPPLIED      Colleen Coronel RN on 6/28/2019 at 9:08 AM

## 2019-06-28 NOTE — PROGRESS NOTES
She reports feeling reassuring daily fetal activity and will continue to record.  She gained 3 lbs since her last visit and denies any fluid leakage or regular uterine contractions.  She received her Maranda injection today and understands the follow up plan.  She did not bring in her glucose log but will continue to call these values in - remains diet-controlled at this time.

## 2019-06-28 NOTE — PATIENT INSTRUCTIONS
If you have any questions regarding your visit, Please contact your care team.    [facility]: [phone]      [facility] HOURS TELEPHONE NUMBER   Elle DO Mindi.    CARL Perez -    CARLOS Zee, CARLOS Macias RN     Monday, Wednesday, Thursday and Friday, [place]  8:30a.m-5:00 p.m   [facility]  [address] [place] [address]  [phone] ask for [facility]    Imaging [phone]       Urgent Care locations:    [facility] Saturday and Sunday   9 am - 5 pm    Monday-Friday   12 pm - 8 pm  Saturday and Sunday   9 am - 5 pm   [phone]     [facility] Labor and Delivery:  [phone]    If you need a medication refill, please contact your pharmacy. Please allow 3 business days for your refill to be completed.  As always, Thank you for trusting us with your healthcare needs!

## 2019-07-03 ENCOUNTER — ALLIED HEALTH/NURSE VISIT (OUTPATIENT)
Dept: EDUCATION SERVICES | Facility: CLINIC | Age: 29
End: 2019-07-03
Payer: COMMERCIAL

## 2019-07-03 VITALS — WEIGHT: 196.5 LBS | BODY MASS INDEX: 34.95 KG/M2

## 2019-07-03 DIAGNOSIS — O24.410 DIET CONTROLLED GESTATIONAL DIABETES MELLITUS (GDM), ANTEPARTUM: Primary | ICD-10-CM

## 2019-07-03 PROCEDURE — G0108 DIAB MANAGE TRN  PER INDIV: HCPCS

## 2019-07-03 NOTE — PATIENT INSTRUCTIONS
Fairlife milk- 1/2 the amount of carb and more protein than  Regular cow milk.    Sample 2 Carb Breakfast Choices- Carbohydrate choices found in (  )   1/2 cup cooked cereal  (1)  1 tablespoon raisins (1)  2 tablespoons of nuts (0)      1 whole grain English muffin (2)    1 tablespoon of peanut butter (0)  6 oz light yogurt (1)  1 cup berries (1)    1 hard-boiled egg (0) 1 slice whole grain toast (1) with 1 tsp butter (0)  1 poached egg (0)    banana (1)   8 oz skim milk (1) +  1 packet of no-sugar-added instant breakfast mix (1) 2 slices whole grain toast (2)   1 scrambled egg (0)  1 slice low fat cheese (0) 1 piece whole grain toast (1)    cup breakfast potatoes (1)  1 boiled egg (0)  Vegetables (0)   1 cup skim milk (1)     medium banana (1)  1 Tbsp peanut butter (0)       Here are some ideas for breakfast or bedtime snack. Pick a carbohydrate from the right and a protein from the left. If you have carbohydrates 30 grams of total carbohydrate and 3-5 grams of fiber that is even better.   Fruits are not listed as they can cause the blood sugar to raise blood sugar quickly and be used up early in the night. Fruits are better at meals, or morning or afternoon snack.     Protein/Fat list (about 14 grams of protein or 2 fat servings) Carbohydrate list (about 30 grams of carbohydrate)   2 slices of cheese English Muffin   2 TBS Peanut butter/Gipsy butter/Sun butter 10 crackers     cup Cottage cheese 2% 2 pieces of toast   2 oz any cooked meat - less than deck of cards size 1 hamburger or hot dog bun   1.5 oz of soy nuts 1 whole wheat sanna   Avocado or guacamole 6 matthew cracker squares     cup tuna - can add mayonnaise 1 cup full fat ice cream - no candy or sauce   2 eggs - boiled, poached, fried, scrambled, omelet 30 chips   1 veggie roderick (might have carbohydrates also) Greek yogurt - 30 grams of carbohydrate   2 TBS Coconut 2 - 6 inch tortillas corn or flour   12 shrimp - not breaded 2 toaster waffles - no syrup    2-1oz meatballs   bagel   2 Tbs cream cheese - plain, veggie, salmon - no fruit or honey. 6 cups popcorn - unsweetened     cup of nuts Granola bar of 3o grams of carbohydrate   10 olives 1 cup of unsweetened lentils or beans.    Tofu or Temph 4-5oz 1 cup potato salad     You can always add vegetables with dip, salad dressing or salsa also.     Avoid juice and regular pop until you deliver your baby.    Email your food and blood glucose records for review weekly or sooner if concerns or you have 3 elevations of blood glucose in 7 days.    Instructions for emailing the Diabetes Educators    If you need to communicate a non-urgent message to a Diabetes Educator via email, please send to [email or web address].    Please follow the following email guidelines:    Subject line: Secure: your clinic name (example: Secure: [facility])  In the email please include: First name, middle initial, last name and date of birth.    We will be in touch with you within one (1) business day.    Normal levels of BG:  Fasting- 70-99 mg/dl  2 hours after the start of a meal- less than 140 mg/dl.

## 2019-07-03 NOTE — PROGRESS NOTES
Diabetes Self-Management Education & Support  Gestational Diabetes Self-Management Education & Support    SUBJECTIVE/OBJECTIVE:  Presents for education related to gestational diabetes.      Cultural Influences/Ethnic Background:  American      Wt 89.1 kg (196 lb 8 oz)   LMP 12/07/2018   BMI 34.95 kg/m          Estimated Date of Delivery: Sep 17, 2019    Blood Glucose/Ketone Log:                   Lifestyle and Health Behaviors:  How intense was your typical exercise? : Light (like stretching or slow walking)  Barrier to exercise: None  Meals include: Breakfast, Lunch, Dinner, Snacks  Beverages: Water, Milk, Juice, Soda  Cultural/Anabaptism diet restrictions?: No    Healthy Coping:  Informal Support system:: Children, Radha based, Family, Friends, Significant other    Current Management:       ASSESSMENT:  Ketones: 86% negative  Fasting blood glucoses: 86% in target.  After breakfast: 29% in target.  After lunch: 80% in target.  After dinner: 50% in target.    Pt continues to drink juice and regular pop which is raising her pp values.  Pt states that she can make further changes to avoid starting insulin today.  Pt not eating regular snacks and prefers sweets when she does.    INTERVENTION:  Educational topics covered today:  What to expect after delivery, Future testing for Type 2 diabetes (2 hour OGTT at 6 week post-partum check-up and annual fasting blood glucose level), Risk of GDM and planning ahead for future pregnancies, Recommended lifestyle interventions for reducing the risk of Type 2 Diabetes, When to Call a Diabetes Educator or OB Provider  Healthy eating- patient given information re healthy breakfasts and snacks for GDM pts. Also instructed re importance of avoiding regular soda and juice until after she delivers baby. She states that she feels she can make changes to improve her blood glucose control.   Recommend 2 CHO at breakfast.    Educational Materials provided today:  [place] Preventing  Diabetes    PLAN:  Check glucose 4 times daily.  Check ketones once a week when readings are consistently negative.  recommended physical activity- 5-10 minutes of walking after meals.  Continue to follow recommended meal plan: 2 carbs at breakfast, 3-4 carbs at lunch, 3-4 carbs at supper, 1-2 carbs at snacks.  Follow consistent CHO meal plan, eat CHO and protein/fat at all meals/snacks.    Call/e-mail/MyChart message diabetes educator if 3 or more blood sugars are above the goal in 1 week or if ketones are positive.  Pt to call or email in her food and blood glucose records for review in 1 week or sooner if elevations or concerns.   [facility] email form signed.    Chel Adams RN  BSN CDE    Time Spent: 60 minutes  Encounter Type: Individual    Any diabetes medication dose changes were made via the CDE Protocol and Collaborative Practice Agreement with the patient's OB/GYN provider.

## 2019-07-05 ENCOUNTER — ALLIED HEALTH/NURSE VISIT (OUTPATIENT)
Dept: NURSING | Facility: CLINIC | Age: 29
End: 2019-07-05
Payer: COMMERCIAL

## 2019-07-05 DIAGNOSIS — O09.891 HISTORY OF PRETERM DELIVERY, CURRENTLY PREGNANT IN FIRST TRIMESTER: Primary | ICD-10-CM

## 2019-07-05 PROCEDURE — 99207 ZZC NO CHARGE NURSE ONLY: CPT

## 2019-07-05 PROCEDURE — 96372 THER/PROPH/DIAG INJ SC/IM: CPT

## 2019-07-05 RX ADMIN — HYDROXYPROGESTERONE CAPROATE 250 MG: 250 INJECTION INTRAMUSCULAR at 08:55

## 2019-07-05 NOTE — PROGRESS NOTES
Prior to injection, verified patient identity using patient's name and date of birth.  Due to injection administration, patient instructed to remain in clinic for 15 minutes  afterwards, and to report any adverse reaction to me immediately.    Hydrozyprogesterone Caproate Maranda    Drug Amount Wasted:  None.  Vial/Syringe: Single dose vial  Expiration Date:      The following medication was given:     MEDICATION: Hydroxyprogesterone Caproate 250mg/mL in oil ([place])  ROUTE: IM  SITE: Ventrogluteal - Left  DOSE: 250mg/1ml  LOT #: [id number]  :  American [place]  EXPIRATION DATE:    NDC: 1002-4696-16    Injection was provided by: Colleen GONZALEZ     Patient tolerated injection well, denies complications. Was informed to wait in lobby for 15 minutes to monitor for adverse reaction. Patient verbalized understanding, she had no further concerns or questions at this time. Next injection scheduled. Clinic supplied.     Colleen Coronel RN on 7/5/2019 at 8:57 AM

## 2019-07-11 ENCOUNTER — PRENATAL OFFICE VISIT (OUTPATIENT)
Dept: OBGYN | Facility: CLINIC | Age: 29
End: 2019-07-11
Payer: COMMERCIAL

## 2019-07-11 ENCOUNTER — PATIENT OUTREACH (OUTPATIENT)
Dept: EDUCATION SERVICES | Facility: CLINIC | Age: 29
End: 2019-07-11

## 2019-07-11 ENCOUNTER — MYC REFILL (OUTPATIENT)
Dept: OBGYN | Facility: CLINIC | Age: 29
End: 2019-07-11

## 2019-07-11 VITALS
BODY MASS INDEX: 34.95 KG/M2 | SYSTOLIC BLOOD PRESSURE: 114 MMHG | HEART RATE: 105 BPM | DIASTOLIC BLOOD PRESSURE: 83 MMHG | WEIGHT: 196.5 LBS | OXYGEN SATURATION: 98 %

## 2019-07-11 DIAGNOSIS — O24.410 DIET CONTROLLED GESTATIONAL DIABETES MELLITUS (GDM), ANTEPARTUM: ICD-10-CM

## 2019-07-11 DIAGNOSIS — O24.410 DIET CONTROLLED GESTATIONAL DIABETES MELLITUS (GDM) IN SECOND TRIMESTER: ICD-10-CM

## 2019-07-11 DIAGNOSIS — O09.213 HISTORY OF PRETERM LABOR, CURRENT PREGNANCY, THIRD TRIMESTER: Primary | ICD-10-CM

## 2019-07-11 PROCEDURE — 99207 ZZC PRENATAL VISIT: CPT | Performed by: OBSTETRICS & GYNECOLOGY

## 2019-07-11 PROCEDURE — 96372 THER/PROPH/DIAG INJ SC/IM: CPT | Performed by: OBSTETRICS & GYNECOLOGY

## 2019-07-11 RX ADMIN — HYDROXYPROGESTERONE CAPROATE 250 MG: 250 INJECTION INTRAMUSCULAR at 09:25

## 2019-07-11 NOTE — PROGRESS NOTES
Gestational Diabetes Follow-up    Subjective/Objective:    Satinder Avalos sent in blood glucose log for review. Last date of communication was: 7/3/19.    Gestational diabetes is being managed with diet    Taking diabetes medications: no    Estimated Date of Delivery: Sep 17, 2019    BG/Food Log:           Assessment:    Ketones: neg.   Fasting blood glucoses: 71% in target.  After breakfast: 85% in target.  After lunch: 83% in target.  After dinner: 85% in target.    Is still skipping some snacks, especially bedtime snacks    Plan/Response:  Follow-up in 3-4 days.  Reduce ketone checking to once weekly    Hi Satinder,    Thanks for sending us your log book! I noticed that you have really cut back on sugary beverages, and that has been really helping to improve those after meal numbers. Also, with your ketone levels consistently negative, you can reduce this to once weekly. Your morning numbers are starting to rise close to our target, and that is something that I'd like to watch closer. Sometimes those morning numbers rise as a consequence of your pregnancy hormones, and if needed, we can provide insulin to manage those numbers if they continue to rise. However, there are some food choices that you can focus on to optimize your morning numbers. I noticed that your bedtime snack is skipped often, and this can really be contributing to a high fasting number. For the next few days, try to have 2 carb servings PLUS protein every single night, and send us your numbers again on Monday morning.    I know Khushbu gave you a handout on 2 carb +protein snack options, but let me know if you need any other help with this. Otherwise, we'll watch for an email Monday morning.   Thanks!    Sofie Russo RD LD CDE    Any diabetes medication dose changes were made via the CDE Protocol and Collaborative Practice Agreement with the patient's OB/GYN provider. A copy of this encounter was shared with the provider.    Patient  replied:  April Carrizales,     Yes drinking just water has helped A lot with getting my numbers down. I will send in my morning numbers for the next few days on Monday.     Thank you  Satinder

## 2019-07-11 NOTE — PROGRESS NOTES
Clinic Administered Medication Documentation      Injectable Medication Documentation    Patient was given Hydroxyprogesterone Caproate 250 mg/1mL. Prior to medication administration, verified patients identity using patient s name and date of birth. Please see MAR and medication order for additional information. Patient instructed to remain in clinic for 15 minutes and report any adverse reaction to staff immediately .      Was entire vial of medication used? Yes  Vial/Syringe: Single dose vial  Expiration Date:  09/2020  Was this medication supplied by the patient? No, clinic supplied.     The following medication was given:     MEDICATION: Hydroxyprogesterone Caproate 250mg/mL in oil ([place])  ROUTE: IM  SITE: Ventrogluteal - Right  DOSE: 250 mg/1mL  LOT #: [id number]  :  American [place]  EXPIRATION DATE:  09/2020  NDC: 5615-3927-70    Injection was provided by: CLINIC    Patient tolerated injection well, denies complications. Was informed to wait in lobby for 15 minutes to monitor for adverse reaction. Patient verbalized understanding, she had no further concerns or questions at this time. Next injection already scheduled.    Nicolasa Rodriguez RN on 7/11/2019 at 9:31 AM

## 2019-07-11 NOTE — PROGRESS NOTES
30w2d   Tired.  No HA, visual changes, N/V   Using [place] weekly  She is seeing Diabetic ed for gestational diabetes, diet control.   RTC 2 wk for obv, 1 week for Maranda Villar MD

## 2019-07-12 NOTE — TELEPHONE ENCOUNTER
"Requested Prescriptions   Pending Prescriptions Disp Refills     blood glucose (ONETOUCH VERIO IQ) test strip 200 each 3     Sig: Use to test blood sugar 4 times daily       Diabetic Supplies Protocol Passed - 7/11/2019  9:02 PM        Passed - Medication is active on med list        Passed - Patient is 18 years of age or older        Passed - Recent (6 mo) or future (30 days) visit within the authorizing provider's specialty     Patient had office visit in the last 6 months or has a visit in the next 30 days with authorizing provider.  See \"Patient Info\" tab in inbasket, or \"Choose Columns\" in Meds & Orders section of the refill encounter.            06/26/2019 ordered dispense 200 Refill 3. Pt should have refills at her pharmacy still.   Will mychart pt to let her know she should call her pharmacy to have it refilled.    Colleen Coronel RN on 7/12/2019 at 8:45 AM    "

## 2019-07-16 ENCOUNTER — PATIENT OUTREACH (OUTPATIENT)
Dept: EDUCATION SERVICES | Facility: CLINIC | Age: 29
End: 2019-07-16

## 2019-07-16 DIAGNOSIS — O24.410 DIET CONTROLLED GESTATIONAL DIABETES MELLITUS (GDM), ANTEPARTUM: Primary | ICD-10-CM

## 2019-07-16 RX ORDER — BLOOD-GLUCOSE METER
1 EACH MISCELLANEOUS 4 TIMES DAILY
Qty: 1 KIT | Refills: 0 | Status: SHIPPED | OUTPATIENT
Start: 2019-07-16 | End: 2019-09-11

## 2019-07-16 NOTE — PROGRESS NOTES
E-mail from patient:  This is Satinder Avalos  86 I attempted to refill my test strips for my monitor and was told by the pharmacy that my insurance no longer covers that brand and I would need to get a new prescription for all the testing strips and monitor. She stated if the new prescription just states generic brand they will be able to find a product that my insurance will cover.     CDE reply:  Kirt Wolfenaina,    Yes [facility] decided on  to switch meter brands.  I sent a prescription for the contour next meter.  Your insurance now covers the accu chek and contour next brands.  I have found the contour next meter to be the most accurate meter on the market and would prefer you to have that vs. coto on what the pharmacy will pick out.    Please let us know if you have any further issues with your testing supply prescription.    It looks like you are due to send us an update, could you send a picture of your logbook?    Thanks!      Comfort Grewal MS, RD, LD, CDE

## 2019-07-17 ENCOUNTER — PATIENT OUTREACH (OUTPATIENT)
Dept: EDUCATION SERVICES | Facility: CLINIC | Age: 29
End: 2019-07-17

## 2019-07-17 NOTE — PROGRESS NOTES
Gestational Diabetes Follow-up    Subjective/Objective:    Satinder Avalos sent in blood glucose log for review. Last date of communication was: 7/11/19.    Gestational diabetes is being managed with diet and activity    Taking diabetes medications: no    Estimated Date of Delivery: Sep 17, 2019    BG/Food Log:         Assessment:    Ketones: neg.   Fasting blood glucoses: 100% in target.  After breakfast: 100% in target.  After lunch: 100% in target.  After dinner: 100% in target.    Still skipping some snacks, especially bedtime snack    Plan/Response:  No changes in the patient's current treatment plan.  Follow-up in 1 week.    Kirt Rajan,    Thanks for the update - this week things are looking great! You've really done a great job at keeping those blood sugars in range! I think there is still a little work to be done on getting in those between meal snacks (remember, you and baby need that energy), but overall you really are doing so well. Let's check back in 1 week, or sooner if you have 3 or more unexplained high numbers before then.    Thanks!    Sofie Russo RD LD CDE    Any diabetes medication dose changes were made via the CDE Protocol and Collaborative Practice Agreement with the patient's OB/GYN provider. A copy of this encounter was shared with the provider.      ADDENDUM:    Patient email:    I did  my new testing equipment but this morning when I went to test it s saying I need to change the batteries. I will miss some testing for today as I am working and unable to get new batteries right at the moment. Just wanted to inform you of that. Not sure if it was normal for the batteries to be dead that fast after getting the equipment.     CDE reply:    Shoot, that s a bummer! That s ok, thank you for the heads up, but I am sure that puts a little extra stress on your day!

## 2019-07-19 ENCOUNTER — ALLIED HEALTH/NURSE VISIT (OUTPATIENT)
Dept: NURSING | Facility: CLINIC | Age: 29
End: 2019-07-19
Payer: COMMERCIAL

## 2019-07-19 DIAGNOSIS — O09.213 HISTORY OF PRETERM LABOR, CURRENT PREGNANCY, THIRD TRIMESTER: Primary | ICD-10-CM

## 2019-07-19 PROCEDURE — 99207 ZZC NO CHARGE NURSE ONLY: CPT

## 2019-07-19 PROCEDURE — 96372 THER/PROPH/DIAG INJ SC/IM: CPT

## 2019-07-19 RX ADMIN — HYDROXYPROGESTERONE CAPROATE 250 MG: 250 INJECTION INTRAMUSCULAR at 09:16

## 2019-07-19 NOTE — PROGRESS NOTES
Clinic Administered Medication Documentation    Hydroxyprogesterone Caproate Maranda     The following medication was given:     MEDICATION: Hydroxyprogesterone Caproate 250mg/mL in oil (Maranda)  ROUTE: IM  SITE: Ventrogluteal - Left  DOSE: 250mg/1ml  LOT #: [id number]  :  American [place]  EXPIRATION DATE:  07/2020  NDC: 2416-2097-00    Injection was provided by: Colleen Coronel RN     Patient tolerated injection well, denies complications. Was informed to wait in lobby for 15 minutes to monitor for adverse reaction. Patient verbalized understanding, she had no further concerns or questions at this time. Next injection scheduled. Clinic supplied medication.       Colleen Coronel RN on 7/19/2019 at 9:18 AM

## 2019-07-24 ENCOUNTER — PRENATAL OFFICE VISIT (OUTPATIENT)
Dept: OBGYN | Facility: CLINIC | Age: 29
End: 2019-07-24
Payer: COMMERCIAL

## 2019-07-24 VITALS
OXYGEN SATURATION: 99 % | DIASTOLIC BLOOD PRESSURE: 83 MMHG | BODY MASS INDEX: 35.75 KG/M2 | HEART RATE: 104 BPM | SYSTOLIC BLOOD PRESSURE: 119 MMHG | WEIGHT: 201 LBS

## 2019-07-24 DIAGNOSIS — O09.213 HISTORY OF PRETERM LABOR, CURRENT PREGNANCY, THIRD TRIMESTER: Primary | ICD-10-CM

## 2019-07-24 PROCEDURE — 59426 ANTEPARTUM CARE ONLY: CPT | Performed by: OBSTETRICS & GYNECOLOGY

## 2019-07-24 PROCEDURE — 96372 THER/PROPH/DIAG INJ SC/IM: CPT | Performed by: OBSTETRICS & GYNECOLOGY

## 2019-07-24 PROCEDURE — 99207 ZZC PRENATAL VISIT: CPT | Performed by: OBSTETRICS & GYNECOLOGY

## 2019-07-24 RX ADMIN — HYDROXYPROGESTERONE CAPROATE 250 MG: 250 INJECTION INTRAMUSCULAR at 15:11

## 2019-07-24 NOTE — PATIENT INSTRUCTIONS
If you have any questions regarding your visit, Please contact your care team.    [employer]: [phone]      [facility] HOURS TELEPHONE NUMBER   Elle DO Mindi.    CARL Perez -    CARLOS Zee, CARLOS Macias RN     Monday, Wednesday, Thursday and Friday, [place]  8:30a.m-5:00 p.m   [facility]  [address] [place] [address]  [phone] ask for [facility]    Imaging [phone]       Urgent Care locations:    [facility] Saturday and Sunday   9 am - 5 pm    Monday-Friday   12 pm - 8 pm  Saturday and Sunday   9 am - 5 pm   [phone]     [facility] Labor and Delivery:  [phone]    If you need a medication refill, please contact your pharmacy. Please allow 3 business days for your refill to be completed.  As always, Thank you for trusting us with your healthcare needs!

## 2019-07-24 NOTE — PROGRESS NOTES
"Clinic Administered Medication Documentation      Injectable Medication Documentation    Patient was given Hydroxyprogesterone Caproate 250mg/1mL. Prior to medication administration, verified patients identity using patient s name and date of birth. Please see MAR and medication order for additional information. Patient instructed to remain in clinic for 15 minutes and report any adverse reaction to staff immediately .      Was entire vial of medication used? Yes  Vial/Syringe: Single dose vial  Expiration Date:  07/2020  Was this medication supplied by the patient? No, CLINIC SUPPLIED    The following medication was given:     MEDICATION: Hydroxyprogesterone Caproate 250mg/mL in oil ([place])  ROUTE: IM  SITE: Ventrogluteal - Right  DOSE: 250 mg/1mL  LOT #: [id number]  :  American [place]oz  EXPIRATION DATE:  07/2020  NDC:3877-1533-63     Injection was provided by: CLINIC supply    Patient tolerated injection well, had some lingering pain from last injection location on left hip. Patient stated injection felt \"too high\" for her. RN assessed site. No further complaints.     Patient to see provider today for prenatal visit, will report any adverse reactions if they develop.     Patient verbalized understanding, she had no further concerns or questions at this time. Next injection scheduled.    Nicolasa Rodriguez RN on 7/24/2019 at 3:16 PM            "

## 2019-07-24 NOTE — PROGRESS NOTES
She reports feeling reassuring daily fetal activity and will continue to record.  She gained 5 lbs since her last visit but denies any fluid leakage or regular uterine contractions.  She admits to not calling in her glucose values to the diabetic educator on a regular basis so was advised to do this.  The patient understands that her cerclage will be removed at 36-37 weeks gestation and her last [place] injection will be a 37 weeks.  She received her next Maranda injection today.  She is experiencing some numbness of her right fingers but declines a splint for presumed carpal tunnel syndrome during pregnancy.  She will f/u with FP for this if her c/o continues.

## 2019-07-30 ENCOUNTER — TRANSFERRED RECORDS (OUTPATIENT)
Dept: HEALTH INFORMATION MANAGEMENT | Facility: CLINIC | Age: 29
End: 2019-07-30

## 2019-09-04 ENCOUNTER — MYC MEDICAL ADVICE (OUTPATIENT)
Dept: OBGYN | Facility: CLINIC | Age: 29
End: 2019-09-04

## 2019-09-04 NOTE — LETTER
[address] [address]  [phone]        2019                                                                                                             RE: Satinder Avalos,  1990    To Whom It May Concern:    Satinder Avalos is under our care for  delivery on 2019. Her post-partum recovery following hospitalization at [facility] and monitoring of  infant through 2019.      I anticipate that she will be able to return to work on 10/07/2019 with the following restrictions: None      I hope this information is sufficient for your needs.  If you have any additional questions regarding this matter please contact our office.  Thank you.    Sincerely,        Elle Obregon DO

## 2019-09-11 ENCOUNTER — PRENATAL OFFICE VISIT (OUTPATIENT)
Dept: OBGYN | Facility: CLINIC | Age: 29
End: 2019-09-11
Payer: COMMERCIAL

## 2019-09-11 ENCOUNTER — MYC MEDICAL ADVICE (OUTPATIENT)
Dept: OBGYN | Facility: CLINIC | Age: 29
End: 2019-09-11

## 2019-09-11 ENCOUNTER — TELEPHONE (OUTPATIENT)
Dept: ALLERGY | Facility: CLINIC | Age: 29
End: 2019-09-11

## 2019-09-11 VITALS
SYSTOLIC BLOOD PRESSURE: 103 MMHG | DIASTOLIC BLOOD PRESSURE: 68 MMHG | WEIGHT: 199.2 LBS | BODY MASS INDEX: 35.43 KG/M2 | HEART RATE: 92 BPM | OXYGEN SATURATION: 97 %

## 2019-09-11 DIAGNOSIS — Z51.6 NEED FOR DESENSITIZATION TO ALLERGENS: ICD-10-CM

## 2019-09-11 DIAGNOSIS — Z30.09 GENERAL COUNSELING FOR PRESCRIPTION OF ORAL CONTRACEPTIVES: ICD-10-CM

## 2019-09-11 DIAGNOSIS — N89.8 VAGINAL DISCHARGE: Primary | ICD-10-CM

## 2019-09-11 LAB
SPECIMEN SOURCE: ABNORMAL
WET PREP SPEC: ABNORMAL

## 2019-09-11 PROCEDURE — 87210 SMEAR WET MOUNT SALINE/INK: CPT | Performed by: OBSTETRICS & GYNECOLOGY

## 2019-09-11 PROCEDURE — 99207 ZZC POST PARTUM EXAM: CPT | Performed by: OBSTETRICS & GYNECOLOGY

## 2019-09-11 RX ORDER — ACETAMINOPHEN AND CODEINE PHOSPHATE 120; 12 MG/5ML; MG/5ML
0.35 SOLUTION ORAL DAILY
Qty: 84 TABLET | Refills: 3 | Status: SHIPPED | OUTPATIENT
Start: 2019-09-11 | End: 2021-02-02

## 2019-09-11 ASSESSMENT — ANXIETY QUESTIONNAIRES
3. WORRYING TOO MUCH ABOUT DIFFERENT THINGS: NOT AT ALL
7. FEELING AFRAID AS IF SOMETHING AWFUL MIGHT HAPPEN: NOT AT ALL
GAD7 TOTAL SCORE: 0
2. NOT BEING ABLE TO STOP OR CONTROL WORRYING: NOT AT ALL
5. BEING SO RESTLESS THAT IT IS HARD TO SIT STILL: NOT AT ALL
6. BECOMING EASILY ANNOYED OR IRRITABLE: NOT AT ALL
1. FEELING NERVOUS, ANXIOUS, OR ON EDGE: NOT AT ALL

## 2019-09-11 ASSESSMENT — PATIENT HEALTH QUESTIONNAIRE - PHQ9
SUM OF ALL RESPONSES TO PHQ QUESTIONS 1-9: 2
5. POOR APPETITE OR OVEREATING: NOT AT ALL

## 2019-09-11 NOTE — PATIENT INSTRUCTIONS
If you have any questions regarding your visit, Please contact your care team.    [facility] [facility]: [phone]      [facility] HOURS TELEPHONE NUMBER   Elle DO Mindi.    CARL Perez -    CARLOS Zee, CARLOS Macias RN     Monday, Wednesday, Thursday and Friday, [place]  8:30a.m-5:00 p.m   [facility]  [address] [place] [address]  [phone] ask for [facility]    Imaging [id number]-[phone]       Urgent Care locations:    [facility] Saturday and Sunday   9 am - 5 pm    Monday-Friday   12 pm - 8 pm  Saturday and Sunday   9 am - 5 pm   [phone]     [facility] Labor and Delivery:  [phone]    If you need a medication refill, please contact your pharmacy. Please allow 3 business days for your refill to be completed.  As always, Thank you for trusting us with your healthcare needs!

## 2019-09-11 NOTE — TELEPHONE ENCOUNTER
I called her cell number and left a message on her recorder.  She is to call Dr. Thomas's office for an appt for desensitization.  She will need to take 2 grams of Flagyl po (four 500 mg tablets) x one.   She will need to pump and dump her breast milk x 72 hours afterwards.  She is to have her  get treatment or else there is a risk of ping ponging the infection back and forth.  She is to call back with any questions.

## 2019-09-11 NOTE — PROGRESS NOTES
Satinder is here for a postpartum checkup.  She denies any problems after delivery and would like to discuss her contraceptive options while breastfeeding.  However, she is not interested in sterilization since she is considering a third pregnancy in the future.    She had a   OB History    Para Term  AB Living   4 2 0 2 2 2   SAB TAB Ectopic Multiple Live Births   0 0 0 0 2      # Outcome Date GA Lbr Saul/2nd Weight Sex Delivery Anes PTL Lv   4  19 33w2d  1.673 kg (3 lb 11 oz) F    CHAYITO      Name: Radha Kat  05 25w0d  0.624 kg (1 lb 6 oz) F Vag-Spont   CHAYITO   2 AB            1 AB               Since delivery, she has been breast feeding.  She has not had a normal menses.  She has not had intercourse.  Patient screened for postpartum depression and complaints are NEGATIVE. Screening has also been completed for intimate partner violence. She would like to discuss her contraceptive options while breastfeeding.      PE: /68   Pulse 92   Wt 90.4 kg (199 lb 3.2 oz)   LMP 2018   SpO2 97%   Breastfeeding? Yes   BMI 35.43 kg/m    Body mass index is 35.43 kg/m .    General Appearance:  healthy, alert, active, no distress  Cardiovascular:  Regular rate and Rhythm without murmur  Lungs:  Clear, without wheeze, rale or rhonchi  Abdomen: Benign, Soft, flat, non-tender, No masses, organomegaly, No inguinal nodes and Bowel sounds normoactive.   Pelvic:       - Ext: Vulva and perineum are normal without lesion, mass or discharge        - Bladder: no tenderness, no masses       - Vagina: Normal mucosa, small amount of frothy white vaginal discharge so a wet prep was obtained and submitted to lab       - Cervix: normal and multiparous       - Uterus:Normal shape, position and consistencyfirm, nontender, nongravid uterus without CMT       - Adnexa: Normal without masses or tenderness       - Rectal: deferred    A/P  Routine Postpartum Exam, Abnormal Vaginal Discharge, Hx of  "High-Risk HPV Per DNA Marker Test     - I discussed the new pap recommendations regarding screening.  Explained the rationale for increased intervals between paps.  Questions asked and answered.  She does agree to this regiment.   - Pap was not performed but she is due for colposcopy so this will be scheduled.  She had a normal pap on 2/20/19 but her DNA marker test was + for \"other\" high-risk HPV subtypes.     - Contraception: She requests a script for Micronor so this was sent to her pharmacy.  After weaning, she may decide to switch from Micronor to the Ortho Evra patch so will call.   - Submit her wet prep to lab and treat if +.  She tested + for trichomonas but states that she has an allergy to Flagyl.  Unfortunately, there is not an alternative therapy that is as effective so the patient needs desensitization for use of Flagyl 2 grams po x 1.  She will need to take four 500 mg tabs po for treatment.  The pt is aware of this and also is to get her partner treated as well.  She is to schedule an appt with Dr. Gene Thomas at the [facility], Derm/Allergist, for desensitization ([phone]).          Elle Obregon, DO  FACOG, FACS  "

## 2019-09-11 NOTE — TELEPHONE ENCOUNTER
Reason for call:  Other   Patient called regarding (reason for call): call back  Additional comments: Nicolasa from [facility] is calling because she wants to know where to send the patient for desensitisation due to flagyl. Please call back     Phone number to reach patient:  Other phone number:  [phone]    Best Time:  any    Can we leave a detailed message on this number?  YES

## 2019-09-11 NOTE — TELEPHONE ENCOUNTER
Dr. Thomas is located at [facility], he is a dermatologist and allergologist. Appointment scheduling [phone]. RN notified Dr. Obregon in person, requesting referral for consult.     Nicolasa Rodriguez RN on 9/11/2019 at 1:53 PM

## 2019-09-11 NOTE — TELEPHONE ENCOUNTER
Reviewed with Dr. Traore, this is typically an inpatient procedure. Contacted Nicolasa and provided contact name of Dr. Thomas with the [facility] for patient to schedule a consult with.     Mirella Schwartz RN on 9/11/2019 at 1:47 PM

## 2019-09-12 ASSESSMENT — ANXIETY QUESTIONNAIRES: GAD7 TOTAL SCORE: 0

## 2019-09-19 NOTE — TELEPHONE ENCOUNTER
FUTURE VISIT INFORMATION      FUTURE VISIT INFORMATION:    Date: 10.4.19    Time: 8:45 AM    Location: [facility]  REFERRAL INFORMATION:    Referring provider:  Dr. Obregon    Referring providers clinic:      Reason for visit/diagnosis  Reaction to medication Flagyl    RECORDS REQUESTED FROM:       Clinic name Comments Records Status Imaging Status    9.11.19 Dr. Obregon referral and office visit   [facility] 2.21.19 Urgent Care, allergy to Metonidazole Care Everywhere

## 2019-09-20 ENCOUNTER — MYC MEDICAL ADVICE (OUTPATIENT)
Dept: OBGYN | Facility: CLINIC | Age: 29
End: 2019-09-20

## 2019-09-23 PROBLEM — O42.919 PRETERM PREMATURE RUPTURE OF MEMBRANES (PPROM) WITH UNKNOWN ONSET OF LABOR: Status: ACTIVE | Noted: 2019-07-30

## 2019-09-24 NOTE — TELEPHONE ENCOUNTER
RN completed Guardian STD paperwork sent in by patient for extension of STD (Please see Smart Pipe messages and attachment).     RN attached pregnancy episode documentation as requested and faxed to [phone]. Copy made and placed in scanning.     Patient updated via Smart Pipe communication.     Nicolasa Rodriguez RN on 9/24/2019 at 8:52 AM

## 2019-09-28 ENCOUNTER — HEALTH MAINTENANCE LETTER (OUTPATIENT)
Age: 29
End: 2019-09-28

## 2019-10-02 ENCOUNTER — MYC MEDICAL ADVICE (OUTPATIENT)
Dept: OBGYN | Facility: CLINIC | Age: 29
End: 2019-10-02

## 2019-10-04 ENCOUNTER — PRE VISIT (OUTPATIENT)
Dept: ALLERGY | Facility: CLINIC | Age: 29
End: 2019-10-04

## 2019-10-08 NOTE — TELEPHONE ENCOUNTER
I returned her call and she can be retested with a wet prep at the time of her colposcopy since she is now asymptomatic.

## 2020-01-09 ENCOUNTER — TELEPHONE (OUTPATIENT)
Dept: OBGYN | Facility: CLINIC | Age: 30
End: 2020-01-09

## 2020-01-09 DIAGNOSIS — N87.1 DYSPLASIA OF CERVIX, HIGH GRADE CIN 2: ICD-10-CM

## 2020-01-09 NOTE — TELEPHONE ENCOUNTER
Pt is past due for f/u pap smear if declining recommended postpartum colposcopy.  [facility] clinic and schedule.    Kamila Ann  Pap Tracking

## 2020-02-11 ENCOUNTER — MEDICAL CORRESPONDENCE (OUTPATIENT)
Dept: HEALTH INFORMATION MANAGEMENT | Facility: CLINIC | Age: 30
End: 2020-02-11

## 2020-07-10 NOTE — LETTER
[address] [address]  [phone]        2019                                                                                                             RE: Satinder Avalos,  1990    To Whom It May Concern:    Satinder Avalos is under our care for  delivery on 2019. Her post-partum recovery following hospitalization at [facility] and monitoring of  infant through 2019.      I anticipate that she will be able to return to work on 10/06/2019 with the following restrictions: None      I hope this information is sufficient for your needs.  If you have any additional questions regarding this matter please contact our office.  Thank you.    Sincerely,        Elle Obreogn DO                 Patient Education        Abdominal Pain: Care Instructions  Your Care Instructions     Abdominal pain has many possible causes. Some aren't serious and get better on their own in a few days. Others need more testing and treatment. If your pain continues or gets worse, you need to be rechecked and may need more tests to find out what is wrong. You may need surgery to correct the problem. Don't ignore new symptoms, such as fever, nausea and vomiting, urination problems, pain that gets worse, and dizziness. These may be signs of a more serious problem. Your doctor may have recommended a follow-up visit in the next 8 to 12 hours. If you are not getting better, you may need more tests or treatment. The doctor has checked you carefully, but problems can develop later. If you notice any problems or new symptoms, get medical treatment right away. Follow-up care is a key part of your treatment and safety. Be sure to make and go to all appointments, and call your doctor if you are having problems. It's also a good idea to know your test results and keep a list of the medicines you take. How can you care for yourself at home? · Rest until you feel better. · To prevent dehydration, drink plenty of fluids, enough so that your urine is light yellow or clear like water. Choose water and other caffeine-free clear liquids until you feel better. If you have kidney, heart, or liver disease and have to limit fluids, talk with your doctor before you increase the amount of fluids you drink. · If your stomach is upset, eat mild foods, such as rice, dry toast or crackers, bananas, and applesauce. Try eating several small meals instead of two or three large ones. · Wait until 48 hours after all symptoms have gone away before you have spicy foods, alcohol, and drinks that contain caffeine. · Do not eat foods that are high in fat. · Avoid anti-inflammatory medicines such as aspirin, ibuprofen (Advil, Motrin), and naproxen (Aleve). These can cause stomach upset. Talk to your doctor if you take daily aspirin for another health problem. When should you call for help? [id number] anytime you think you may need emergency care. For example, call if:  · You passed out (lost consciousness). · You pass maroon or very bloody stools. · You vomit blood or what looks like coffee grounds. · You have new, severe belly pain. Call your doctor now or seek immediate medical care if:  · Your pain gets worse, especially if it becomes focused in one area of your belly. · You have a new or higher fever. · Your stools are black and look like tar, or they have streaks of blood. · You have unexpected vaginal bleeding. · You have symptoms of a urinary tract infection. These may include:  ? Pain when you urinate. ? Urinating more often than usual.  ? Blood in your urine. · You are dizzy or lightheaded, or you feel like you may faint. Watch closely for changes in your health, and be sure to contact your doctor if:  · You are not getting better after 1 day (24 hours). Where can you learn more? Go to http://jozef-grace.info/  Enter D517 in the search box to learn more about \"Abdominal Pain: Care Instructions. \"  Current as of: June 26, 2019               Content Version: 12.5  © 6176-3789 [employer]. Care instructions adapted under license by [employer] (which disclaims liability or warranty for this information). If you have questions about a medical condition or this instruction, always ask your healthcare professional. [place] [address] any warranty or liability for your use of this information. Patient Education        Back Strain: Care Instructions  Overview     A back strain happens when you overstretch, or pull, a muscle in your back. You may hurt your back in an accident or when you exercise or lift something. Sometimes you may not know how you hurt your back.   Most back pain will get better with rest and time. You can take care of yourself at home to help your back heal.  Follow-up care is a key part of your treatment and safety. Be sure to make and go to all appointments, and call your doctor if you are having problems. It's also a good idea to know your test results and keep a list of the medicines you take. How can you care for yourself at home? · Try to stay as active as you can, but stop or reduce any activity that causes pain. · Put ice or a cold pack on the sore muscle for 10 to 20 minutes at a time to stop swelling. Try this every 1 to 2 hours for 3 days (when you are awake) or until the swelling goes down. Put a thin cloth between the ice pack and your skin. · After 2 or 3 days, apply a heating pad on low or a warm cloth to your back. Some doctors suggest that you go back and forth between hot and cold treatments. · Take pain medicines exactly as directed. ? If the doctor gave you a prescription medicine for pain, take it as prescribed. ? If you are not taking a prescription pain medicine, ask your doctor if you can take an over-the-counter medicine. · Try sleeping on your side with a pillow between your legs. Or put a pillow under your knees when you lie on your back. These measures can ease pain in your lower back. · Return to your usual level of activity slowly. When should you call for help? [id number] anytime you think you may need emergency care. For example, call if:  · You are unable to move a leg at all. Call your doctor now or seek immediate medical care if:  · You have new or worse symptoms in your legs, belly, or buttocks. Symptoms may include:  ? Numbness or tingling. ? Weakness. ? Pain. · You lose bladder or bowel control. Watch closely for changes in your health, and be sure to contact your doctor if:  · You have a fever, lose weight, or don't feel well. · You are not getting better as expected. Where can you learn more?   Go to http://www.gray.com/  Enter [id number] in the search box to learn more about \"Back Strain: Care Instructions. \"  Current as of: March 2, 2020               Content Version: 12.5  © 2006-2020 [employer]. Care instructions adapted under license by [employer] (which disclaims liability or warranty for this information). If you have questions about a medical condition or this instruction, always ask your healthcare professional. [place] [address] any warranty or liability for your use of this information. Patient Education        Neck Strain: Care Instructions  Your Care Instructions     You have strained the muscles and ligaments in your neck. A sudden, awkward movement can strain the neck. This often occurs with falls or car accidents or during certain sports. Everyday activities like working on a computer or sleeping can also cause neck strain if they force you to hold your neck in an awkward position for a long time. It is common for neck pain to get worse for a day or two after an injury, but it should start to feel better after that. You may have more pain and stiffness for several days before it gets better. This is expected. It may take a few weeks or longer for it to heal completely. Good home treatment can help you get better faster and avoid future neck problems. Follow-up care is a key part of your treatment and safety. Be sure to make and go to all appointments, and call your doctor if you are having problems. It's also a good idea to know your test results and keep a list of the medicines you take. How can you care for yourself at home? · If you were given a neck brace (cervical collar) to limit neck motion, wear it as instructed for as many days as your doctor tells you to. Do not wear it longer than you were told to. Wearing a brace for too long can make neck stiffness worse and weaken the neck muscles.   · You can try using heat or ice to see if it helps. ? Try using a heating pad on a low or medium setting for 15 to 20 minutes every 2 to 3 hours. Try a warm shower in place of one session with the heating pad. You can also buy single-use heat wraps that last up to 8 hours. ? You can also try an ice pack for 10 to 15 minutes every 2 to 3 hours. · Take pain medicines exactly as directed. ? If the doctor gave you a prescription medicine for pain, take it as prescribed. ? If you are not taking a prescription pain medicine, ask your doctor if you can take an over-the-counter medicine. · Gently rub the area to relieve pain and help with blood flow. Do not massage the area if it hurts to do so. · Do not do anything that makes the pain worse. Take it easy for a couple of days. You can do your usual activities if they do not hurt your neck or put it at risk for more stress or injury. · Try sleeping on a special neck pillow. Place it under your neck, not under your head. Placing a tightly rolled-up towel under your neck while you sleep will also work. If you use a neck pillow or rolled towel, do not use your regular pillow at the same time. · To prevent future neck pain, do exercises to stretch and strengthen your neck and back. Learn how to use good posture, safe lifting techniques, and proper body mechanics. When should you call for help? [id number] anytime you think you may need emergency care. For example, call if:  · You are unable to move an arm or a leg at all. Call your doctor now or seek immediate medical care if:  · You have new or worse symptoms in your arms, legs, chest, belly, or buttocks. Symptoms may include:  ? Numbness or tingling. ? Weakness. ? Pain. · You lose bladder or bowel control. Watch closely for changes in your health, and be sure to contact your doctor if:  · You are not getting better as expected. Where can you learn more?   Go to http://www.gray.com/  Enter M253 in the search box to learn more about \"Neck Strain: Care Instructions. \"  Current as of: March 2, 2020               Content Version: 12.5  © 2006-2020 [employer]. Care instructions adapted under license by [employer] (which disclaims liability or warranty for this information). If you have questions about a medical condition or this instruction, always ask your healthcare professional. [facility] any warranty or liability for your use of this information. Patient Education        Hip Pain: Care Instructions  Your Care Instructions     Hip pain may be caused by many things, including overuse, a fall, or a twisting movement. Another cause of hip pain is arthritis. Your pain may increase when you stand up, walk, or squat. The pain may come and go or may be constant. Home treatment can help relieve hip pain, swelling, and stiffness. If your pain is ongoing, you may need more tests and treatment. Follow-up care is a key part of your treatment and safety. Be sure to make and go to all appointments, and call your doctor if you are having problems. It's also a good idea to know your test results and keep a list of the medicines you take. How can you care for yourself at home? · Take pain medicines exactly as directed. ? If the doctor gave you a prescription medicine for pain, take it as prescribed. ? If you are not taking a prescription pain medicine, ask your doctor if you can take an over-the-counter medicine. · Rest and protect your hip. Take a break from any activity, including standing or walking, that may cause pain. · Put ice or a cold pack against your hip for 10 to 20 minutes at a time. Try to do this every 1 to 2 hours for the next 3 days (when you are awake) or until the swelling goes down. Put a thin cloth between the ice and your skin. · Sleep on your healthy side with a pillow between your knees, or sleep on your back with pillows under your knees.   · If there is no swelling, you can put moist heat, a heating pad, or a warm cloth on your hip. Do gentle stretching exercises to help keep your hip flexible. · Learn how to prevent falls. Have your vision and hearing checked regularly. Wear slippers or shoes with a nonskid sole. · Stay at a healthy weight. · Wear comfortable shoes. When should you call for help? [id number] anytime you think you may need emergency care. For example, call if:  · You have sudden chest pain and shortness of breath, or you cough up blood. · You are not able to stand or walk or bear weight. · Your buttocks, legs, or feet feel numb or tingly. · Your leg or foot is cool or pale or changes color. · You have severe pain. Call your doctor now or seek immediate medical care if:  · You have signs of infection, such as:  ? Increased pain, swelling, warmth, or redness in the hip area. ? Red streaks leading from the hip area. ? Pus draining from the hip area. ? A fever. · You have signs of a blood clot, such as:  ? Pain in your calf, back of the knee, thigh, or groin. ? Redness and swelling in your leg or groin. · You are not able to bend, straighten, or move your leg normally. · You have trouble urinating or having bowel movements. Watch closely for changes in your health, and be sure to contact your doctor if:  · You do not get better as expected. Where can you learn more? Go to http://www.gray.com/  Enter Z720 in the search box to learn more about \"Hip Pain: Care Instructions. \"  Current as of: June 26, 2019               Content Version: 12.5  © 5866-4729 [employer]. Care instructions adapted under license by [employer] (which disclaims liability or warranty for this information). If you have questions about a medical condition or this instruction, always ask your healthcare professional. [place] [address] any warranty or liability for your use of this information. Patient Education        Knee Pain or Injury: Care Instructions  Your Care Instructions     Injuries are a common cause of knee problems. Sudden (acute) injuries may be caused by a direct blow to the knee. They can also be caused by abnormal twisting, bending, or falling on the knee. Pain, bruising, or swelling may be severe, and may start within minutes of the injury. Overuse is another cause of knee pain. Other causes are climbing stairs, kneeling, and other activities that use the knee. Everyday wear and tear, especially as you get older, also can cause knee pain. Rest, along with home treatment, often relieves pain and allows your knee to heal. If you have a serious knee injury, you may need tests and treatment. Follow-up care is a key part of your treatment and safety. Be sure to make and go to all appointments, and call your doctor if you are having problems. It's also a good idea to know your test results and keep a list of the medicines you take. How can you care for yourself at home? · Be safe with medicines. Read and follow all instructions on the label. ? If the doctor gave you a prescription medicine for pain, take it as prescribed. ? If you are not taking a prescription pain medicine, ask your doctor if you can take an over-the-counter medicine. · Rest and protect your knee. Take a break from any activity that may cause pain. · Put ice or a cold pack on your knee for 10 to 20 minutes at a time. Put a thin cloth between the ice and your skin. · Prop up a sore knee on a pillow when you ice it or anytime you sit or lie down for the next 3 days. Try to keep it above the level of your heart. This will help reduce swelling. · If your knee is not swollen, you can put moist heat, a heating pad, or a warm cloth on your knee. · If your doctor recommends an elastic bandage, sleeve, or other type of support for your knee, wear it as directed.   · Follow your doctor's instructions about how much weight you can put on your leg. Use a cane, crutches, or a walker as instructed. · Follow your doctor's instructions about activity during your healing process. If you can do mild exercise, slowly increase your activity. · Reach and stay at a healthy weight. Extra weight can strain the joints, especially the knees and hips, and make the pain worse. Losing even a few pounds may help. When should you call for help? [id number] anytime you think you may need emergency care. For example, call if:  · You have symptoms of a blood clot in your lung (called a pulmonary embolism). These may include:  ? Sudden chest pain. ? Trouble breathing. ? Coughing up blood. Call your doctor now or seek immediate medical care if:  · You have severe or increasing pain. · Your leg or foot turns cold or changes color. · You cannot stand or put weight on your knee. · Your knee looks twisted or bent out of shape. · You cannot move your knee. · You have signs of infection, such as:  ? Increased pain, swelling, warmth, or redness. ? Red streaks leading from the knee. ? Pus draining from a place on your knee. ? A fever. · You have signs of a blood clot in your leg (called a deep vein thrombosis), such as:  ? Pain in your calf, back of the knee, thigh, or groin. ? Redness and swelling in your leg or groin. Watch closely for changes in your health, and be sure to contact your doctor if:  · You have tingling, weakness, or numbness in your knee. · You have any new symptoms, such as swelling. · You have bruises from a knee injury that last longer than 2 weeks. · You do not get better as expected. Where can you learn more? Go to http://www.gray.com/  Enter K195 in the search box to learn more about \"Knee Pain or Injury: Care Instructions. \"  Current as of: June 26, 2019               Content Version: 12.5  © 7707-2347 [employer].    Care instructions adapted under license by [employer] (which disclaims liability or warranty for this information). If you have questions about a medical condition or this instruction, always ask your healthcare professional. [address] any warranty or liability for your use of this information.

## 2021-01-10 ENCOUNTER — HEALTH MAINTENANCE LETTER (OUTPATIENT)
Age: 31
End: 2021-01-10

## 2021-01-28 NOTE — PROGRESS NOTES
SUBJECTIVE:   CC: Satinder Avalos is an 30 year old woman who presents for preventive health visit.     {  Patient has been advised of split billing requirements and indicates understanding: Yes  Healthy Habits:    Do you get at least three servings of calcium containing foods daily (dairy, green leafy vegetables, etc.)? yes    Amount of exercise or daily activities, outside of work: has toddler    Problems taking medications regularly not applicable    Medication side effects: No    Have you had an eye exam in the past two years? no    Do you see a dentist twice per year? no    Do you have sleep apnea, excessive snoring or daytime drowsiness?yes-snoring      Mole check chin and thigh  Achy left hip    Today's PHQ-2 Score:   PHQ-2 ( 1999 Pfizer) 4/16/2013 3/4/2013   Q1: Little interest or pleasure in doing things 0 -   Q2: Feeling down, depressed or hopeless 0 -   PHQ-2 Score 0 -   Q1: Little interest or pleasure in doing things - Not at all   PHQ-2 Score - Incomplete       Abuse: Current or Past(Physical, Sexual or Emotional)- No  Do you feel safe in your environment? Yes        Social History     Tobacco Use     Smoking status: Never Smoker     Smokeless tobacco: Never Used   Substance Use Topics     Alcohol use: Yes     Frequency: Never     Comment: very rare     If you drink alcohol do you typically have >3 drinks per day or >7 drinks per week? No                     Reviewed orders with patient.  Reviewed health maintenance and updated orders accordingly - Yes  Lab work is in process        Mammo not indicated  History of abnormal Pap smear: based on results  PAP / HPV Latest Ref Rng & Units 2/20/2019 3/7/2013 3/22/2012   PAP - NIL ASC-US(A) NIL   HPV 16 DNA NEG:Negative Negative - -   HPV 18 DNA NEG:Negative Negative - -   OTHER HR HPV NEG:Negative Positive(A) - -     Reviewed and updated as needed this visit by clinical staff                 Reviewed and updated as needed this visit by Provider       "              ROS:  CONSTITUTIONAL: NEGATIVE for fever, chills, change in weight  INTEGUMENTARU/SKIN: NEGATIVE for worrisome rashes, moles or lesions  EYES: NEGATIVE for vision changes or irritation  ENT: NEGATIVE for ear, mouth and throat problems  RESP: NEGATIVE for significant cough or SOB  BREAST: NEGATIVE for masses, tenderness or discharge  CV: NEGATIVE for chest pain, palpitations or peripheral edema  GI: NEGATIVE for nausea, abdominal pain, heartburn, or change in bowel habits  : NEGATIVE for unusual urinary or vaginal symptoms. Periods are regular.  MUSCULOSKELETAL: NEGATIVE for significant arthralgias or myalgia  NEURO: NEGATIVE for weakness, dizziness or paresthesias  ENDOCRINE: NEGATIVE for temperature intolerance, skin/hair changes  HEME/ALLERGY/IMMUNE: NEGATIVE for bleeding problems  PSYCHIATRIC: NEGATIVE for changes in mood or affect    OBJECTIVE:   /83 (BP Location: Right arm, Patient Position: Sitting, Cuff Size: Adult Regular)   Pulse 78   Temp 98.2  F (36.8  C) (Oral)   Ht 1.6 m (5' 3\")   Wt 79.4 kg (175 lb)   LMP 01/02/2021 (Approximate)   BMI 31.00 kg/m    EXAM:  GENERAL: healthy, alert and no distress  EYES: Eyes grossly normal to inspection, PERRL and conjunctivae and sclerae normal  HENT: ear canals and TM's normal, nose and mouth without ulcers or lesions  NECK: no adenopathy, no asymmetry, masses, or scars and thyroid normal to palpation  RESP: lungs clear to auscultation - no rales, rhonchi or wheezes  BREAST: normal without masses, tenderness or nipple discharge and no palpable axillary masses or adenopathy  CV: regular rate and rhythm, normal S1 S2, no S3 or S4, no murmur, click or rub, no peripheral edema and peripheral pulses strong  ABDOMEN: soft, nontender, no hepatosplenomegaly, no masses and bowel sounds normal   (female): normal female external genitalia, normal urethral meatus, vaginal mucosa pink, moist, well rugated, and normal cervix/adnexa/uterus without " masses or discharge  MS: no gross musculoskeletal defects noted, no edema.  Flesh colored nevus on her chin.  There is a dark one cm nevus in her groin.  SKIN: no suspicious lesions or rashes  NEURO: Normal strength and tone, mentation intact and speech normal  PSYCH: mentation appears normal, affect normal/bright    Diagnostic Test Results:  Labs reviewed in Epic  No results found for this or any previous visit (from the past 24 hour(s)).    ASSESSMENT/PLAN:   (Z01.419) Encounter for gynecological examination without abnormal finding  (primary encounter diagnosis)  Comment:   Plan:     (N87.1) Dysplasia of cervix, high grade ELICEO 2  Comment:   Plan: Pap imaged thin layer diagnostic with HPV         (select HPV order below), HPV High Risk Types         DNA Cervical            (Z30.016) Encounter for initial prescription of transdermal patch hormonal contraceptive device  Comment:   Plan: norelgestromin-ethinyl estradiol (ORTHO EVRA)         150-35 MCG/24HR patch  Denies contraindications to patch use.   The use of the hormonal contraceptive patch has been fully discussed with the patient. This includes the proper method to initiate  and continue the patch, the need for regular compliance to ensure adequate contraceptive effect, the physiology which makes the patch effective, the instructions for what to do in event of a missed patch or a patch that has fallen off, and warnings about anticipated minor side effects such as breakthrough spotting, nausea, breast tenderness, weight changes, acne, headaches, etc.  She has been told of the more serious potential side effects such as MI, stroke, and deep vein thrombosis, all of which are very unlikely.  She has been asked to report any signs of such serious problems immediately.  She understands and wishes to take the medication as prescribed.              (Z11.3) Routine screening for STI (sexually transmitted infection)  Comment:   Plan: Neisseria gonorrhoeae PCR,  "Chlamydia         trachomatis PCR                COUNSELING:   Reviewed preventive health counseling, as reflected in patient instructions       Contraception       Family planning    Estimated body mass index is 35.43 kg/m  as calculated from the following:    Height as of 2/20/19: 1.597 m (5' 2.87\").    Weight as of 9/11/19: 90.4 kg (199 lb 3.2 oz).    Recommend to follow up with derm for nevus evaluation and or removal    She reports that she has never smoked. She has never used smokeless tobacco.      Counseling Resources:  ATP IV Guidelines  Pooled Cohorts Equation Calculator  Breast Cancer Risk Calculator  BRCA-Related Cancer Risk Assessment: FHS-7 Tool  FRAX Risk Assessment  ICSI Preventive Guidelines  Dietary Guidelines for Americans, 2010  USDA's MyPlate  ASA Prophylaxis  Lung CA Screening    [facility] [place]  "

## 2021-02-02 ENCOUNTER — OFFICE VISIT (OUTPATIENT)
Dept: OBGYN | Facility: CLINIC | Age: 31
End: 2021-02-02
Payer: COMMERCIAL

## 2021-02-02 VITALS
SYSTOLIC BLOOD PRESSURE: 126 MMHG | WEIGHT: 175 LBS | BODY MASS INDEX: 31.01 KG/M2 | HEART RATE: 78 BPM | HEIGHT: 63 IN | TEMPERATURE: 98.2 F | DIASTOLIC BLOOD PRESSURE: 83 MMHG

## 2021-02-02 DIAGNOSIS — Z30.016 ENCOUNTER FOR INITIAL PRESCRIPTION OF TRANSDERMAL PATCH HORMONAL CONTRACEPTIVE DEVICE: ICD-10-CM

## 2021-02-02 DIAGNOSIS — Z11.3 ROUTINE SCREENING FOR STI (SEXUALLY TRANSMITTED INFECTION): ICD-10-CM

## 2021-02-02 DIAGNOSIS — N87.1 DYSPLASIA OF CERVIX, HIGH GRADE CIN 2: ICD-10-CM

## 2021-02-02 DIAGNOSIS — Z01.419 ENCOUNTER FOR GYNECOLOGICAL EXAMINATION WITHOUT ABNORMAL FINDING: Primary | ICD-10-CM

## 2021-02-02 PROBLEM — O42.919 PRETERM PREMATURE RUPTURE OF MEMBRANES (PPROM) WITH UNKNOWN ONSET OF LABOR: Status: RESOLVED | Noted: 2019-07-30 | Resolved: 2021-02-02

## 2021-02-02 PROBLEM — O09.213 HISTORY OF PRETERM LABOR, CURRENT PREGNANCY, THIRD TRIMESTER: Status: RESOLVED | Noted: 2019-02-20 | Resolved: 2021-02-02

## 2021-02-02 PROCEDURE — 99395 PREV VISIT EST AGE 18-39: CPT | Performed by: ADVANCED PRACTICE MIDWIFE

## 2021-02-02 PROCEDURE — 87624 HPV HI-RISK TYP POOLED RSLT: CPT | Performed by: ADVANCED PRACTICE MIDWIFE

## 2021-02-02 PROCEDURE — 87491 CHLMYD TRACH DNA AMP PROBE: CPT | Performed by: ADVANCED PRACTICE MIDWIFE

## 2021-02-02 PROCEDURE — 87591 N.GONORRHOEAE DNA AMP PROB: CPT | Performed by: ADVANCED PRACTICE MIDWIFE

## 2021-02-02 RX ORDER — NORELGESTROMIN AND ETHINYL ESTRADIOL 35; 150 UG/MG; UG/MG
PATCH TRANSDERMAL
Qty: 9 PATCH | Refills: 3 | Status: SHIPPED | OUTPATIENT
Start: 2021-02-02 | End: 2021-09-01

## 2021-02-02 ASSESSMENT — MIFFLIN-ST. JEOR: SCORE: 1472.92

## 2021-02-03 LAB
C TRACH DNA SPEC QL NAA+PROBE: NEGATIVE
N GONORRHOEA DNA SPEC QL NAA+PROBE: NEGATIVE
SPECIMEN SOURCE: NORMAL
SPECIMEN SOURCE: NORMAL

## 2021-02-05 LAB
COPATH REPORT: NORMAL
PAP: NORMAL

## 2021-02-10 LAB
FINAL DIAGNOSIS: NORMAL
HPV HR 12 DNA CVX QL NAA+PROBE: NEGATIVE
HPV16 DNA SPEC QL NAA+PROBE: NEGATIVE
HPV18 DNA SPEC QL NAA+PROBE: NEGATIVE
SPECIMEN DESCRIPTION: NORMAL
SPECIMEN SOURCE CVX/VAG CYTO: NORMAL

## 2021-02-15 ENCOUNTER — PATIENT OUTREACH (OUTPATIENT)
Dept: OBGYN | Facility: CLINIC | Age: 31
End: 2021-02-15

## 2021-02-15 NOTE — TELEPHONE ENCOUNTER
2/2/21 NIL pap, Neg HPV. Plan: cotest in 1 yr, due 2/2/22     Patient seen and evaluated independently medical resident note reviewed, I agree with plan and management, except as I have noted.

## 2021-02-24 ENCOUNTER — OFFICE VISIT (OUTPATIENT)
Dept: OBGYN | Facility: CLINIC | Age: 31
End: 2021-02-24
Payer: COMMERCIAL

## 2021-02-24 VITALS
BODY MASS INDEX: 30.88 KG/M2 | SYSTOLIC BLOOD PRESSURE: 134 MMHG | DIASTOLIC BLOOD PRESSURE: 89 MMHG | HEART RATE: 64 BPM | WEIGHT: 174.3 LBS

## 2021-02-24 DIAGNOSIS — A59.09 TRICHOMONAL CERVICITIS: ICD-10-CM

## 2021-02-24 DIAGNOSIS — N89.8 VAGINAL ODOR: Primary | ICD-10-CM

## 2021-02-24 LAB
SPECIMEN SOURCE: ABNORMAL
WET PREP SPEC: ABNORMAL

## 2021-02-24 PROCEDURE — 99213 OFFICE O/P EST LOW 20 MIN: CPT | Performed by: ADVANCED PRACTICE MIDWIFE

## 2021-02-24 PROCEDURE — 87210 SMEAR WET MOUNT SALINE/INK: CPT | Performed by: ADVANCED PRACTICE MIDWIFE

## 2021-02-24 RX ORDER — TINIDAZOLE 500 MG/1
2000 TABLET ORAL ONCE
Qty: 4 TABLET | Refills: 0 | Status: SHIPPED | OUTPATIENT
Start: 2021-02-24 | End: 2021-02-24

## 2021-02-24 NOTE — PROGRESS NOTES
Satinder Avalos is a 30 year old who presents to the clinic for evaluation of vaginal changes and odor.  Has not started the patch yet   Since her LMP has had thicker vaginal discharge and a mild fishy odor.  Has not had sex since her LMP.  Reports on and off issues with what she assumes was flagyl.   In 2014 after 5 days of flagyl thought she had some tingling and perhaps some swelling.  Has taken it since without issue.  Her history of chronic BV was about a 8-9 years ago and has had rare reoccurrence since her IUD was removed.       Histories reviewed and updated  Past Medical History:   Diagnosis Date     Abnormal Pap smear of cervix 03/07/2013, 2018    see problem list     PID (acute pelvic inflammatory disease)     age 10     Past Surgical History:   Procedure Laterality Date     CERCLAGE CERVICAL N/A 4/9/2019    Procedure: CERCLAGE CERVICAL;  Surgeon: Natalie Villa MD;  Location:  L+D     CONIZATION LEEP N/A 11/13/2018    Procedure: EUA, COLPOSCOPY, LEEP;  Surgeon: Elle Obregon DO;  Location:  OR     Social History     Socioeconomic History     Marital status: Single     Spouse name: Not on file     Number of children: Not on file     Years of education: Not on file     Highest education level: Not on file   Occupational History     Occupation: PCA    Social Needs     Financial resource strain: Not on file     Food insecurity     Worry: Not on file     Inability: Not on file     Transportation needs     Medical: Not on file     Non-medical: Not on file   Tobacco Use     Smoking status: Never Smoker     Smokeless tobacco: Never Used   Substance and Sexual Activity     Alcohol use: Yes     Frequency: Never     Comment: very rare     Drug use: Yes     Types: Marijuana     Comment: stopped with pregnancy     Sexual activity: Yes     Partners: Male     Birth control/protection: None   Lifestyle     Physical activity     Days per week: Not on file     Minutes per session: Not on file      Stress: Not on file   Relationships     Social connections     Talks on phone: Not on file     Gets together: Not on file     Attends [employer] service: Not on file     Active member of club or organization: Not on file     Attends meetings of clubs or organizations: Not on file     Relationship status: Not on file     Intimate partner violence     Fear of current or ex partner: Not on file     Emotionally abused: Not on file     Physically abused: Not on file     Forced sexual activity: Not on file   Other Topics Concern     Parent/sibling w/ CABG, MI or angioplasty before 65F 55M? 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    EXAM:  /89 (BP Location: Right arm, Cuff Size: Adult Regular)   Pulse 64   Wt 79.1 kg (174 lb 4.8 oz)   LMP 02/02/2021 (Exact Date)   Breastfeeding No   BMI 30.88 kg/m      : PELVIC EXAM:  Vulva: No external lesions, normal hair distribution, no adenopathy, BUS WNL  Vagina: Moist, pink, homogeneous discharge, well rugated, no lesions  Cervix: smooth, pink, no visible lesions,   Psych:  alert, with normal speech and behavior    Wet prep with trich    ASSESSMENT/PLAN:    (N89.8) Vaginal odor  (primary encounter diagnosis)  Comment:   Plan: Wet prep            (A59.09) Trichomonal cervicitis  Comment:   Plan: tinidazole (TINDAMAX) 500 MG tablet              Labs were reviewed in Epic  and the  results were positive for clue and tich          15 minutes spent on the date of the encounter doing chart review, interpretation of tests, patient visit and documentation     Discussed trich and recommendation for treatment of partner.   Discussed issues with possible allergy which seems unlikely after discussion.  However will take precautions.  Has an epi pen and will not be alone when she takes it.

## 2021-05-31 ENCOUNTER — OFFICE VISIT (OUTPATIENT)
Dept: URGENT CARE | Facility: URGENT CARE | Age: 31
End: 2021-05-31
Payer: COMMERCIAL

## 2021-05-31 VITALS
BODY MASS INDEX: 30.82 KG/M2 | WEIGHT: 174 LBS | RESPIRATION RATE: 18 BRPM | HEART RATE: 70 BPM | SYSTOLIC BLOOD PRESSURE: 119 MMHG | DIASTOLIC BLOOD PRESSURE: 80 MMHG
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
## 2021-05-31 DIAGNOSIS — S60.449A CONSTRICTIVE JEWELRY OF FINGER, INITIAL ENCOUNTER: Primary | ICD-10-CM

## 2021-05-31 PROCEDURE — 99203 OFFICE O/P NEW LOW 30 MIN: CPT | Performed by: PHYSICIAN ASSISTANT

## 2021-05-31 NOTE — PROGRESS NOTES
Patient presents with:  Ring Removal (Cpm): Pt has a ring stuck on R thumb     (S60.449A,  W49.04XA) Constrictive jewelry of finger, initial encounter  (primary encounter diagnosis)  Comment: ring removed easily with ring cutter  Plan: no further intervention        SUBJECTIVE:   Satinder Avalos is a 30 year old female who presents today with a ring stuck on her right thumb since last night.  Complains of tingling in her thumb.  Denies any injury            Past Medical History:   Diagnosis Date     Abnormal Pap smear of cervix 03/07/2013, 2018    see problem list     PID (acute pelvic inflammatory disease)     age 10         Current Outpatient Medications   Medication Sig Dispense Refill     Multiple Vitamins-Iron (DAILY-ALAN/IRON/BETA-CAROTENE) TABS TAKE 1 TABLET BY MOUTH DAILY. (Patient not taking: Reported on 10/19/2020) 30 tablet 7     Social History     Tobacco Use     Smoking status: Never Smoker     Smokeless tobacco: Never Used   Substance Use Topics     Alcohol use: Not on file     Family History   Problem Relation Age of Onset     Diabetes Mother      Diabetes Father          ROS:    10 point ROS of systems including Constitutional, Eyes, Respiratory, Cardiovascular, Gastroenterology, Genitourinary, Integumentary, Muscularskeletal, Psychiatric ,neurological were all negative except for pertinent positives noted in my HPI       OBJECTIVE:  /80   Pulse 70   Resp 18   Wt 78.9 kg (174 lb)   BMI 30.82 kg/m    Physical Exam:  GENERAL APPEARANCE: healthy, alert and no distress  Extremities:right thumb: ring on finger. no peripheral edema or tenderness, peripheral pulses normal before and after removal of ring with ring cutter by me.  NEURO: Normal strength and tone, sensory exam grossly normal,  normal speech and mentation  SKIN: no suspicious lesions or rashes

## 2021-06-09 NOTE — PROGRESS NOTES
SUBJECTIVE:   CC: Satinder Avalos is an 30 year old woman who presents for preventive health visit.       Patient has been advised of split billing requirements and indicates understanding: Yes  Healthy Habits:     Getting at least 3 servings of Calcium per day:  Yes    Bi-annual eye exam:  NO    Dental care twice a year:  Yes    Sleep apnea or symptoms of sleep apnea:  None    Diet:  Regular (no restrictions)    Frequency of exercise:  2-3 days/week    Duration of exercise:  45-60 minutes    Taking medications regularly:  Yes    Medication side effects:  Not applicable    PHQ-2 Total Score: 0    Additional concerns today:  Yes    Concerns with vaginal infection     Today's PHQ-2 Score:   PHQ-2 ( 1999 Pfizer) 6/10/2021   Q1: Little interest or pleasure in doing things 0   Q2: Feeling down, depressed or hopeless 0   PHQ-2 Score 0   Q1: Little interest or pleasure in doing things Not at all   Q2: Feeling down, depressed or hopeless Not at all   PHQ-2 Score 0       Abuse: Current or Past (Physical, Sexual or Emotional) - No  Do you feel safe in your environment? Yes    Have you ever done Advance Care Planning? (For example, a Health Directive, POLST, or a discussion with a medical provider or your loved ones about your wishes): No, advance care planning information given to patient to review.  Patient plans to discuss their wishes with loved ones or provider.      Social History     Tobacco Use     Smoking status: Never Smoker     Smokeless tobacco: Never Used   Substance Use Topics     Alcohol use: Yes     Frequency: Never     Comment: very rare     If you drink alcohol do you typically have >3 drinks per day or >7 drinks per week? No    Alcohol Use 6/10/2021   Prescreen: >3 drinks/day or >7 drinks/week? No   Prescreen: >3 drinks/day or >7 drinks/week? -       Reviewed orders with patient.  Reviewed health maintenance and updated orders accordingly - Yes  Labs reviewed in EPIC  BP Readings from Last 3  Encounters:   06/10/21 122/82   05/31/21 119/80   02/24/21 134/89    Wt Readings from Last 3 Encounters:   06/10/21 78.5 kg (173 lb)   05/31/21 78.9 kg (174 lb)   02/24/21 79.1 kg (174 lb 4.8 oz)                  Patient Active Problem List   Diagnosis     Chronic left SI joint pain     CARDIOVASCULAR SCREENING; LDL GOAL LESS THAN 160     Bacterial vaginosis     Drug allergy     Dysplasia of cervix, high grade ELICEO 2     Elevated BP     Obesity     Flu vaccine need     History of cervical LEEP biopsy affecting care of mother, antepartum     Diet controlled gestational diabetes mellitus (GDM), antepartum     Encounter for initial prescription of transdermal patch hormonal contraceptive device     Past Surgical History:   Procedure Laterality Date     CERCLAGE CERVICAL N/A 4/9/2019    Procedure: CERCLAGE CERVICAL;  Surgeon: Natalie Villa MD;  Location: [facility]     CONIZATION LEEP N/A 11/13/2018    Procedure: EUA, COLPOSCOPY, LEEP;  Surgeon: Elle Obregon DO;  Location:  OR       Social History     Tobacco Use     Smoking status: Never Smoker     Smokeless tobacco: Never Used   Substance Use Topics     Alcohol use: Yes     Frequency: Never     Comment: very rare     Family History   Problem Relation Age of Onset     Psychotic Disorder Mother         drug/alcohol abuse     Substance Abuse Mother      Psychotic Disorder Father         drug/alcohol abuse     Diabetes Father      Respiratory Maternal Grandfather         copd     Respiratory Paternal Grandmother         copd     Other Cancer Maternal Grandmother         lung     Heart Disease Maternal Grandmother      Unknown/Adopted Paternal Grandfather      Dementia Paternal Grandfather          Current Outpatient Medications   Medication Sig Dispense Refill     EPINEPHrine (EPIPEN 2-LEOBARDO) 0.3 MG/0.3ML injection 2-pack Inject 0.3 mg into the muscle       norelgestromin-ethinyl estradiol (ORTHO EVRA) 150-35 MCG/24HR patch Remove old patch and apply new patch  onto the skin once a week for 3 weeks (21 days). Do not wear patch week 4 (days 22-28), then repeat. (Patient not taking: Reported on 6/10/2021) 9 patch 3     Allergies   Allergen Reactions     Flagyl [Metronidazole Hcl] Swelling     Swollen lips and facial itching       Breast Cancer Screening:  Any new diagnosis of family breast, ovarian, or bowel cancer? No      Patient under 40 years of age: Routine Mammogram Screening not recommended.   Pertinent mammograms are reviewed under the imaging tab.    History of abnormal Pap smear:   Last 3 Pap and HPV Results:   PAP / HPV Latest Ref Rng & Units 2/2/2021 2/20/2019 3/7/2013   PAP - NIL NIL ASC-US(A)   HPV 16 DNA NEG:Negative Negative Negative -   HPV 18 DNA NEG:Negative Negative Negative -   OTHER HR HPV NEG:Negative Negative Positive(A) -       Reviewed and updated as needed this visit by clinical staff                 Reviewed and updated as needed this visit by Provider                Past Medical History:   Diagnosis Date     Abnormal Pap smear of cervix 03/07/2013, 2018    see problem list     PID (acute pelvic inflammatory disease)     age 10      Past Surgical History:   Procedure Laterality Date     CERCLAGE CERVICAL N/A 4/9/2019    Procedure: CERCLAGE CERVICAL;  Surgeon: Natalie Villa MD;  Location: [facility]     CONIZATION LEEP N/A 11/13/2018    Procedure: EUA, COLPOSCOPY, LEEP;  Surgeon: Elle Obregon DO;  Location:  OR       Review of Systems   Constitutional: Negative for chills and fever.   HENT: Negative for congestion, ear pain, hearing loss and sore throat.    Eyes: Negative for pain and visual disturbance.   Respiratory: Negative for cough and shortness of breath.    Cardiovascular: Negative for chest pain, palpitations and peripheral edema.   Gastrointestinal: Negative for abdominal pain, constipation, diarrhea, heartburn, hematochezia and nausea.   Breasts:  Negative for tenderness, breast mass and discharge.   Genitourinary:  "Positive for vaginal discharge. Negative for dysuria, frequency, genital sores, hematuria, pelvic pain, urgency and vaginal bleeding.   Musculoskeletal: Negative for arthralgias, joint swelling and myalgias.   Skin: Negative for rash.   Neurological: Negative for dizziness, weakness, headaches and paresthesias.   Psychiatric/Behavioral: Negative for mood changes. The patient is not nervous/anxious.           OBJECTIVE:   /82   Pulse 88   Resp 12   Ht 1.6 m (5' 3\")   Wt 78.5 kg (173 lb)   LMP 06/08/2021 (Exact Date)   SpO2 97%   BMI 30.65 kg/m    Physical Exam  GENERAL: healthy, alert and no distress  EYES: Eyes grossly normal to inspection and conjunctivae and sclerae normal  HENT: ear canals and TM's normal, nose and mouth without ulcers or lesions  NECK: no adenopathy, no asymmetry, masses, or scars and thyroid normal to palpation  RESP: lungs clear to auscultation - no rales, rhonchi or wheezes  BREAST: normal without masses, tenderness or nipple discharge and no palpable axillary masses or adenopathy  CV: regular rates and rhythm, no murmur, click or rub, peripheral pulses strong and no peripheral edema  ABDOMEN: soft, nontender, no hepatosplenomegaly, no masses and bowel sounds normal   (female): normal female external genitalia, normal urethral meatus, vaginal mucosa pink, moist, well rugated, and normal cervix/adnexa/uterus without masses or discharge  MS: no gross musculoskeletal defects noted, no edema  SKIN: Skin color, texture, turgor normal.    left thigh raised mole darker brown,  Also one one on chin that had been removed before and grew back   NEURO: Normal strength and tone, mentation intact and speech normal  PSYCH: mentation appears normal, affect normal/bright  LYMPH: no cervical, supraclavicular, axillary, or inguinal adenopathy    Diagnostic Test Results:  Labs reviewed in Epic  Results for orders placed or performed in visit on 06/10/21   Lipid panel reflex to direct LDL Fasting  "    Status: None   Result Value Ref Range    Cholesterol 177 <200 mg/dL    Triglycerides 91 <150 mg/dL    HDL Cholesterol 61 >49 mg/dL    LDL Cholesterol Calculated 98 <100 mg/dL    Non HDL Cholesterol 116 <130 mg/dL   Basic metabolic panel     Status: None   Result Value Ref Range    Sodium 137 133 - 144 mmol/L    Potassium 3.9 3.4 - 5.3 mmol/L    Chloride 104 94 - 109 mmol/L    Carbon Dioxide 28 20 - 32 mmol/L    Anion Gap 5 3 - 14 mmol/L    Glucose 84 70 - 99 mg/dL    Urea Nitrogen 11 7 - 30 mg/dL    Creatinine 0.70 0.52 - 1.04 mg/dL    GFR Estimate >90 >60 mL/min/[1.73_m2]    GFR Estimate If Black >90 >60 mL/min/[1.73_m2]    Calcium 9.0 8.5 - 10.1 mg/dL   Wet prep     Status: Abnormal    Specimen: Vagina   Result Value Ref Range    Specimen Description Vagina     Wet Prep No Trichomonas seen     Wet Prep Moderate  Clue cells seen   (A)     Wet Prep No yeast seen     Wet Prep Few  WBC'S seen          ASSESSMENT/PLAN:   Satinder was seen today for physical.    Diagnoses and all orders for this visit:    Routine general medical examination at a health care facility    Vaginal discharge  -     Wet prep    CARDIOVASCULAR SCREENING; LDL GOAL LESS THAN 160  -     Lipid panel reflex to direct LDL Fasting    Screening for diabetes mellitus (DM)  -     JUST IN CASE  -     Basic metabolic panel    Atypical mole thigh left   -     ADULT DERMATOLOGY REFERRAL; Future    PLAN:   Patient needs to follow up in if no improvement,or sooner if worsening of symptoms or other symptoms develop.  Please call [phone] to make appointment  if you do not hear from referrals in the next few days.   Will follow up and/or notify patient of  results via My Chart to determine further need for followup  Follow up office visit in one year for annual health maintenance exam, sooner PRN.      Patient has been advised of split billing requirements and indicates understanding: Yes  COUNSELING:  Reviewed preventive health counseling, as reflected  "in patient instructions  Special attention given to:        Regular exercise       Healthy diet/nutrition       Vision screening       Contraception       Family planning    Estimated body mass index is 30.65 kg/m  as calculated from the following:    Height as of this encounter: 1.6 m (5' 3\").    Weight as of this encounter: 78.5 kg (173 lb).    Weight management plan: Discussed healthy diet and exercise guidelines    She reports that she has never smoked. She has never used smokeless tobacco.      Counseling Resources:  ATP IV Guidelines  Pooled Cohorts Equation Calculator  Breast Cancer Risk Calculator  BRCA-Related Cancer Risk Assessment: FHS-7 Tool  FRAX Risk Assessment  ICSI Preventive Guidelines  Dietary Guidelines for Americans, 2010  USDA's MyPlate  ASA Prophylaxis  Lung CA Screening    MAGDALENA [facility]  "

## 2021-06-09 NOTE — PATIENT INSTRUCTIONS
PLAN:   1.   Symptomatic therapy suggested: Increase calcium to 1000mg and 800iu Vit D  2.  Orders Placed This Encounter   Procedures     Lipid panel reflex to direct LDL Fasting     JUST IN CASE     Basic metabolic panel     ADULT DERMATOLOGY REFERRAL     3. Patient needs to follow up in if no improvement,or sooner if worsening of symptoms or other symptoms develop.  Please call [phone] to make appointment  if you do not hear from referrals in the next few days.   Will follow up and/or notify patient of  results via My Chart to determine further need for followup  Follow up office visit in one year for annual health maintenance exam, sooner PRN.    Preventive Health Recommendations  Female Ages 26 - 39  Yearly exam:   See your health care provider every year in order to    Review health changes.     Discuss preventive care.      Review your medicines if you your doctor has prescribed any.    Until age 30: Get a Pap test every three years (more often if you have had an abnormal result).    After age 30: Talk to your doctor about whether you should have a Pap test every 3 years or have a Pap test with HPV screening every 5 years.   You do not need a Pap test if your uterus was removed (hysterectomy) and you have not had cancer.  You should be tested each year for STDs (sexually transmitted diseases), if you're at risk.   Talk to your provider about how often to have your cholesterol checked.  If you are at risk for diabetes, you should have a diabetes test (fasting glucose).  Shots: Get a flu shot each year. Get a tetanus shot every 10 years.   Nutrition:     Eat at least 5 servings of fruits and vegetables each day.    Eat whole-grain bread, whole-wheat pasta and brown rice instead of white grains and rice.    Get adequate Calcium and Vitamin D.     Lifestyle    Exercise at least 150 minutes a week (30 minutes a day, 5 days of the week). This will help you control your weight and prevent disease.    Limit  alcohol to one drink per day.    No smoking.     Wear sunscreen to prevent skin cancer.    See your dentist every six months for an exam and cleaning.

## 2021-06-10 ENCOUNTER — OFFICE VISIT (OUTPATIENT)
Dept: FAMILY MEDICINE | Facility: CLINIC | Age: 31
End: 2021-06-10
Payer: COMMERCIAL

## 2021-06-10 ENCOUNTER — MYC MEDICAL ADVICE (OUTPATIENT)
Dept: FAMILY MEDICINE | Facility: CLINIC | Age: 31
End: 2021-06-10

## 2021-06-10 VITALS
BODY MASS INDEX: 30.65 KG/M2 | RESPIRATION RATE: 12 BRPM | WEIGHT: 173 LBS | DIASTOLIC BLOOD PRESSURE: 82 MMHG | HEIGHT: 63 IN | HEART RATE: 88 BPM | SYSTOLIC BLOOD PRESSURE: 122 MMHG | OXYGEN SATURATION: 97 %

## 2021-06-10 DIAGNOSIS — D22.9 ATYPICAL MOLE: ICD-10-CM

## 2021-06-10 DIAGNOSIS — B96.89 BV (BACTERIAL VAGINOSIS): ICD-10-CM

## 2021-06-10 DIAGNOSIS — Z13.1 SCREENING FOR DIABETES MELLITUS (DM): ICD-10-CM

## 2021-06-10 DIAGNOSIS — N76.0 BV (BACTERIAL VAGINOSIS): ICD-10-CM

## 2021-06-10 DIAGNOSIS — N76.0 BV (BACTERIAL VAGINOSIS): Primary | ICD-10-CM

## 2021-06-10 DIAGNOSIS — B96.89 BV (BACTERIAL VAGINOSIS): Primary | ICD-10-CM

## 2021-06-10 DIAGNOSIS — N89.8 VAGINAL DISCHARGE: ICD-10-CM

## 2021-06-10 DIAGNOSIS — Z00.00 ROUTINE GENERAL MEDICAL EXAMINATION AT A HEALTH CARE FACILITY: Primary | ICD-10-CM

## 2021-06-10 DIAGNOSIS — Z13.6 CARDIOVASCULAR SCREENING; LDL GOAL LESS THAN 160: ICD-10-CM

## 2021-06-10 LAB
ANION GAP SERPL CALCULATED.3IONS-SCNC: 5 MMOL/L (ref 3–14)
BUN SERPL-MCNC: 11 MG/DL (ref 7–30)
CALCIUM SERPL-MCNC: 9 MG/DL (ref 8.5–10.1)
CHLORIDE SERPL-SCNC: 104 MMOL/L (ref 94–109)
CHOLEST SERPL-MCNC: 177 MG/DL
CO2 SERPL-SCNC: 28 MMOL/L (ref 20–32)
CREAT SERPL-MCNC: 0.7 MG/DL (ref 0.52–1.04)
GFR SERPL CREATININE-BSD FRML MDRD: >90 ML/MIN/{1.73_M2}
GLUCOSE SERPL-MCNC: 84 MG/DL (ref 70–99)
HDLC SERPL-MCNC: 61 MG/DL
LDLC SERPL CALC-MCNC: 98 MG/DL
NONHDLC SERPL-MCNC: 116 MG/DL
POTASSIUM SERPL-SCNC: 3.9 MMOL/L (ref 3.4–5.3)
SODIUM SERPL-SCNC: 137 MMOL/L (ref 133–144)
SPECIMEN SOURCE: ABNORMAL
TRIGL SERPL-MCNC: 91 MG/DL
WET PREP SPEC: ABNORMAL

## 2021-06-10 PROCEDURE — 80061 LIPID PANEL: CPT | Performed by: NURSE PRACTITIONER

## 2021-06-10 PROCEDURE — 87210 SMEAR WET MOUNT SALINE/INK: CPT | Performed by: NURSE PRACTITIONER

## 2021-06-10 PROCEDURE — 99385 PREV VISIT NEW AGE 18-39: CPT | Performed by: NURSE PRACTITIONER

## 2021-06-10 PROCEDURE — 80048 BASIC METABOLIC PNL TOTAL CA: CPT | Performed by: NURSE PRACTITIONER

## 2021-06-10 PROCEDURE — 36415 COLL VENOUS BLD VENIPUNCTURE: CPT | Performed by: NURSE PRACTITIONER

## 2021-06-10 RX ORDER — CLINDAMYCIN HCL 300 MG
300 CAPSULE ORAL 2 TIMES DAILY
Qty: 14 CAPSULE | Refills: 0 | Status: SHIPPED | OUTPATIENT
Start: 2021-06-10 | End: 2021-06-17

## 2021-06-10 RX ORDER — CLINDAMYCIN PHOSPHATE 20 MG/G
1 CREAM VAGINAL AT BEDTIME
Qty: 40 G | Refills: 0 | Status: SHIPPED | OUTPATIENT
Start: 2021-06-10 | End: 2021-09-01

## 2021-06-10 ASSESSMENT — ENCOUNTER SYMPTOMS
FEVER: 0
ABDOMINAL PAIN: 0
COUGH: 0
EYE PAIN: 0
DYSURIA: 0
PARESTHESIAS: 0
HEMATOCHEZIA: 0
HEARTBURN: 0
ARTHRALGIAS: 0
WEAKNESS: 0
CONSTIPATION: 0
SHORTNESS OF BREATH: 0
BREAST MASS: 0
PALPITATIONS: 0
HEMATURIA: 0
NAUSEA: 0
FREQUENCY: 0
NERVOUS/ANXIOUS: 0
DIARRHEA: 0
SORE THROAT: 0
DIZZINESS: 0
CHILLS: 0
HEADACHES: 0
MYALGIAS: 0
JOINT SWELLING: 0

## 2021-06-10 ASSESSMENT — MIFFLIN-ST. JEOR: SCORE: 1463.85

## 2021-06-10 NOTE — TELEPHONE ENCOUNTER
Routing [facility] message to provider to review and advise     Meghna Hutchinson RN, BSN, CMSRN  [facility]

## 2021-06-10 NOTE — RESULT ENCOUNTER NOTE
April Avalso,    Attached are your test results.  -Yeast vaginal infection test is normal - no signs of a yeast infection.  -Bacterial vaginal infection test is POSITIVE.   ADVISE: starting treatment with clindamycin vaginal cream 2%  one applicator (5 g) nightly x 7 nights and a prescription has been sent to your pharmacy.  -Trichomonas vaginal infection test is normal - no signs of a trichomonas infection.   Please contact us if you have any questions.    Brenna Yusuf, CNP

## 2021-06-11 NOTE — RESULT ENCOUNTER NOTE
April Avalos,    Attached are your test results.  -Cholesterol levels (LDL,HDL, Triglycerides) are normal.  ADVISE: rechecking in 5 year.   -Kidney function is normal (Cr, GFR), Sodium is normal, Potassium is normal, Calcium is normal, Glucose is normal.    Please contact us if you have any questions.    Brenna Yusuf, CNP

## 2021-08-19 ASSESSMENT — ANXIETY QUESTIONNAIRES
GAD7 TOTAL SCORE: 0
5. BEING SO RESTLESS THAT IT IS HARD TO SIT STILL: NOT AT ALL
7. FEELING AFRAID AS IF SOMETHING AWFUL MIGHT HAPPEN: NOT AT ALL
GAD7 TOTAL SCORE: 0
3. WORRYING TOO MUCH ABOUT DIFFERENT THINGS: NOT AT ALL
2. NOT BEING ABLE TO STOP OR CONTROL WORRYING: NOT AT ALL
GAD7 TOTAL SCORE: 0
7. FEELING AFRAID AS IF SOMETHING AWFUL MIGHT HAPPEN: NOT AT ALL
6. BECOMING EASILY ANNOYED OR IRRITABLE: NOT AT ALL
4. TROUBLE RELAXING: NOT AT ALL
1. FEELING NERVOUS, ANXIOUS, OR ON EDGE: NOT AT ALL

## 2021-08-20 ASSESSMENT — ANXIETY QUESTIONNAIRES: GAD7 TOTAL SCORE: 0

## 2021-08-23 ENCOUNTER — HOSPITAL ENCOUNTER (OUTPATIENT)
Dept: BEHAVIORAL HEALTH | Facility: CLINIC | Age: 31
Discharge: HOME | End: 2021-08-23
Attending: FAMILY MEDICINE | Admitting: FAMILY MEDICINE
Payer: COMMERCIAL

## 2021-08-23 VITALS — BODY MASS INDEX: 31.65 KG/M2 | WEIGHT: 172 LBS | HEIGHT: 62 IN

## 2021-08-23 PROCEDURE — H0001 ALCOHOL AND/OR DRUG ASSESS: HCPCS | Mod: GT

## 2021-08-23 ASSESSMENT — COLUMBIA-SUICIDE SEVERITY RATING SCALE - C-SSRS
2. HAVE YOU ACTUALLY HAD ANY THOUGHTS OF KILLING YOURSELF?: NO
ATTEMPT LIFETIME: NO
4. HAVE YOU HAD THESE THOUGHTS AND HAD SOME INTENTION OF ACTING ON THEM?: NO
TOTAL  NUMBER OF ABORTED OR SELF INTERRUPTED ATTEMPTS PAST 3 MONTHS: NO
REASONS FOR IDEATION LIFETIME: DOES NOT APPLY
TOTAL  NUMBER OF INTERRUPTED ATTEMPTS LIFETIME: NO
5. HAVE YOU STARTED TO WORK OUT OR WORKED OUT THE DETAILS OF HOW TO KILL YOURSELF? DO YOU INTEND TO CARRY OUT THIS PLAN?: NO
ATTEMPT PAST THREE MONTHS: NO
TOTAL  NUMBER OF INTERRUPTED ATTEMPTS PAST 3 MONTHS: NO
2. HAVE YOU ACTUALLY HAD ANY THOUGHTS OF KILLING YOURSELF LIFETIME?: NO
6. HAVE YOU EVER DONE ANYTHING, STARTED TO DO ANYTHING, OR PREPARED TO DO ANYTHING TO END YOUR LIFE?: NO
5. HAVE YOU STARTED TO WORK OUT OR WORKED OUT THE DETAILS OF HOW TO KILL YOURSELF? DO YOU INTEND TO CARRY OUT THIS PLAN?: NO
1. IN THE PAST MONTH, HAVE YOU WISHED YOU WERE DEAD OR WISHED YOU COULD GO TO SLEEP AND NOT WAKE UP?: NO
6. HAVE YOU EVER DONE ANYTHING, STARTED TO DO ANYTHING, OR PREPARED TO DO ANYTHING TO END YOUR LIFE?: NO
TOTAL  NUMBER OF ABORTED OR SELF INTERRUPTED ATTEMPTS PAST LIFETIME: NO
4. HAVE YOU HAD THESE THOUGHTS AND HAD SOME INTENTION OF ACTING ON THEM?: NO
3. HAVE YOU BEEN THINKING ABOUT HOW YOU MIGHT KILL YOURSELF?: NO
REASONS FOR IDEATION PAST MONTH: DOES NOT APPLY
1. IN THE PAST MONTH, HAVE YOU WISHED YOU WERE DEAD OR WISHED YOU COULD GO TO SLEEP AND NOT WAKE UP?: NO

## 2021-08-23 ASSESSMENT — ANXIETY QUESTIONNAIRES
1. FEELING NERVOUS, ANXIOUS, OR ON EDGE: NOT AT ALL
4. TROUBLE RELAXING: NOT AT ALL
GAD7 TOTAL SCORE: 0
3. WORRYING TOO MUCH ABOUT DIFFERENT THINGS: NOT AT ALL
5. BEING SO RESTLESS THAT IT IS HARD TO SIT STILL: NOT AT ALL
7. FEELING AFRAID AS IF SOMETHING AWFUL MIGHT HAPPEN: NOT AT ALL
6. BECOMING EASILY ANNOYED OR IRRITABLE: NOT AT ALL
2. NOT BEING ABLE TO STOP OR CONTROL WORRYING: NOT AT ALL

## 2021-08-23 ASSESSMENT — PATIENT HEALTH QUESTIONNAIRE - PHQ9: SUM OF ALL RESPONSES TO PHQ QUESTIONS 1-9: 3

## 2021-08-23 ASSESSMENT — PAIN SCALES - GENERAL: PAINLEVEL: NO PAIN (0)

## 2021-08-23 ASSESSMENT — MIFFLIN-ST. JEOR: SCORE: 1443.44

## 2021-08-23 NOTE — PROGRESS NOTES
"[facility]  Provider Name:  Curly Burnsrodo     Credentials:  [facility]    PATIENT'S NAME: Satinder Avalos  PREFERRED NAME: \"Satinder\"  PRONOUNS:  she/her/hers    MRN: [id number]  : 1990  ADDRESS: [address] [place] [address] [address] [facility] [address]  ACCT. NUMBER:  [id number]  DATE OF SERVICE: 21  START TIME: 1:00 PM  END TIME: 2:00 PM  PREFERRED PHONE: [phone]  May we leave a program related message: Yes  SERVICE MODALITY:  Video Visit:      Provider verified identity through the following two step process.  Patient provided:  Patient  and Patient's last 4 digits of SSN    Telemedicine Visit: The patient's condition can be safely assessed and treated via synchronous audio and visual telemedicine encounter.      Reason for Telemedicine Visit: Patient has requested telehealth visit    Originating Site (Patient Location): Patient's home    Distant Site (Provider Location): Provider Remote Setting- Home Office    Consent:  The patient/guardian has verbally consented to: the potential risks and benefits of telemedicine (video visit) versus in person care; bill my insurance or make self-payment for services provided; and responsibility for payment of non-covered services.     Patient would like the video invitation sent by:  Send to e-mail at: [email or web address]    Mode of Communication:  Video Conference via [facility]    As the provider I attest to compliance with applicable laws and regulations related to telemedicine.    UNIVERSAL ADULT Substance Use Disorder DIAGNOSTIC ASSESSMENT    Identifying Information:  Patient is a 30 year old, ;;.  The pronoun use throughout this assessment reflects the patient's chosen pronoun.  Patient was referred for an assessment by [facility] Child Protection Services and Foster care licensing.other.  Patient attended the session alone.     Chief Complaint:   The reason for seeking " "services at this time is: \"Need to get a assessment for foster care licensing stating i do not have any chemical dependency issues\".  The problem(s) began 08/18/21. Patient has not attempted to resolve these concerns in the past.  Patient does not appear to be in severe withdrawal, an imminent safety risk to self or others, or requiring immediate medical attention and may proceed with the assessment interview.    Social/Family History:  Patient reported that she grew up in [place].  She was raised by grandmother;grandfather.  Parents one or both parents remarried.  Patient reported that her childhood was \"pretty normal besides living with grand parents due to my mother's issues'.  Patient described her current relationships with family of origin as \"good\".    The patient describes her cultural background as ;;.  Cultural influences and impact on patient's life structure, values, norms, and healthcare: None.  Contextual influences on patient's health include: Family Factors \"my mother was a crack addict\".  Patient identified her preferred language to be English. Patient reported that she does not need the assistance of an  or other support involved in therapy.  Patient reports that she is not involved in any consistent community of matt activities.  She reports spirituality impacts recovery in the following ways:  \"It would be a positive impact\".     Patient reported had no significant delays in developmental tasks.   Patient's highest education level was associate degree / vocational certificate. Patient identified the following learning problems: none reported.  Patient reports that she is able to understand written materials.    Patient reported the following relationship history \"I was with the father of my oldest child for 2 and half years'.  Patient's current relationship status is partner or significant other for 9 years.   Patient identified her " "sexual orientation as heterosexual.  Patient reported having 2 child(michael).     Patient's current living/housing situation involves staying in own home/apartment.  The immediate members of family and household include Carlito, 35,[employer] orher and they report that housing is stable. Patient identified siblings;friends;spouse as part of her support system.  Patient identified the quality of these relationships as good.      Patient reports engaging in the following recreational/leisure activities: \"go out with kids and family, friends, way and walking\".  Patient is currently employed fulltime.  Patient reports that her income is obtained through employment.  Patient does identify finances as a current stressor.      Patient reports the following substance related arrests or legal issues: DUI on 9/27/2020-15-20 days of home monitoring and Level 2 driving with care class.\".  Patient does not They are under the jurisdiction of the court: : [place] County: [place].    Patient's Strengths and Limitations:  Patient identified the following strengths or resources that will help them succeed in treatment: commitment to health and well being, community involvement, matt / spirituality, friends / good social support, family support, insight, motivation and sober support group / recovery support . Things that may interfere with the patient's success in treatment include: financial hardship.     Personal and Family Medical History:  Patient does not report a family history of mental health concerns.  Patient reports family history includes Cancer in her maternal uncle; Cerebrovascular Disease in her maternal uncle and paternal grandfather; Dementia in her paternal grandfather; Depression in her maternal uncle; Diabetes in her father; Heart Disease in her maternal grandmother; Hyperlipidemia in her maternal grandmother; Lung Cancer in her maternal grandmother; Other Cancer in her maternal grandmother; " "Psychotic Disorder in her father and mother; Respiratory in her maternal grandfather and paternal grandmother; Substance Abuse in her mother; Unknown/Adopted in her paternal grandfather..      Patient denied any previous mental health diagnoses.  Patient received mental health services in the past: \"10 years ago when I was a child\".  Psychiatric Hospitalizations: Psychiatric Hospitalizations: none.  Patient denies a history of civil commitment.  Current mental health services/providers include:  None reported.    GAIN-SS Tool:    When was the last time that you had significant problems... 8/23/2021   with feeling very trapped, lonely, sad, blue, depressed or hopeless about the future? Never   with sleep trouble, such as bad dreams, sleeping restlessly, or falling asleep during the day? Past Month   with feeling very anxious, nervous, tense, scared, panicked or like something bad was going to happen? 1+ years ago   with becoming very distressed & upset when something reminded you of the past? Past month   with thinking about ending your life or committing suicide? Never     When was the last time that you did the following things 2 or more times? 8/23/2021   Lied or conned to get things you wanted or to avoid having to do something? Never   Had a hard time paying attention at school, work or home? Past month   Had a hard time listening to instructions at school, work or home? Never   Were a bully or threatened other people? 1+ years ago   Started physical fights with other people? 1+ years ago       Patient has had a physical exam to rule out medical causes for current symptoms.  Date of last physical exam was within the past year. Client was encouraged to follow up with PCP if symptoms were to develop. The patient has a [facility] Provider, who is named No Ref-Primary, Physician..  Patient reports no current medical concerns and no current dental concerns.  Patient denies any issues with pain.. Patient " denies pregnancy..  There are not significant appetite / nutritional concerns / weight changes.  Patient does not report a history of an eating disorder.  Patient does not report a history of head injury / trauma / cognitive impairment.  None.    Patient reports not taking any current medications    Medication Adherence:  Patient reports not currently prescribed. any medications at this time..      Patient Allergies:    Allergies   Allergen Reactions     Flagyl [Metronidazole Hcl] Swelling     Swollen lips and facial itching       Medical History:    Past Medical History:   Diagnosis Date     Abnormal Pap smear of cervix 2013,     see problem list     PID (acute pelvic inflammatory disease)     age 10       Rating Scales:    PHQ9:    PHQ-9 SCORE 2019   PHQ-9 Total Score 2 3       GAD7:    PHOEBE-7 SCORE 2019   Total Score - 0 (minimal anxiety) -   Total Score 0 0 0           Substance Use:  Patient reported the following biological family members or relatives with chemical health issues: Mother reportedly used cocaine .  Patient has not received substance use disorder and/or gambling treatment in the past.  Patient has not ever been to detox.  Patient is not currently receiving any chemical dependency treatment. Patient reports no history of support group attendance.      Substance History of use Age of first use Pattern and duration of use (include amounts and frequency) Date of last use     [facility] of administration   Alcohol currently use   15 Drinking socially-3-4 shots.  Last year my brother passed away, cope with lost by drinkin-8 shots daily, this year my sister passed away but drinking was the last thing I wanted to do.  Drinking socially less, few times I have been out and had only one drink and ready to go home   19 4-5 drinks but was not drinking that day. No oral   Cannabis   never used   NA   NA  NA     Amphetamines   never  "used   NA   NA  NA   Cocaine/crack    never used    NA    NA  NA   Hallucinogens never used      NA    NA  NA   Inhalants never used      NA    NA  NA   Heroin never used      NA    NA  NA   Other Opiates never used   NA   NA  NA   Benzodiazepine   never used   NA   NA  NA   Barbiturates never used   NA   NA  NA   Over the counter meds never used   NA   NA  NA   Caffeine never used   NA   NA  NA   Nicotine  never used   NA   NA  NA   other substances not listed above:  Identify:  never used   NA   NA  NA       Patient reported the following problems as a result of her substance use:DUI  .  Patient is not concerned about her alcohol use at this time.  Patient reports that her recovery goals are \"manage my drinking by consuming less, no drinking and driving.     Patient reports experiencing the following withdrawal symptoms within the past 12 months: none and the following within the past 30 days: none.   Patients reports no urges to use Alcohol.  Patient reports )she has used more Alcohol than intended and over a longer period of time than intended. Patient reports she has had unsuccessful attempts to cut down or control use of Alcohol.  Patient reports longest period of abstinence was \"a year and half\" and return to use was due to \"being out with friends\". Patient reports she has needed to use more Alcohol to achieve the same effect.  Patient does  report diminished effect with use of same amount of Alcohol.     Patient does  report a great deal of time is spent in activities necessary to obtain, use, or recover from Alcohol effects.  Patient does not report important social, occupational, or recreational activities are given up or reduced because of Alcohol use.  Alcohol use is continued despite knowledge of having a persistent or recurrent physical or psychological problem that is likely to have caused or exacerbated by use.  Patient reports the following problem behaviors while under the influence of substances " "\"a little louder and happy drunk\".     Patient reports substance use has not ever impacted her ability to function in a school setting. Patient reports substance use has not ever impacted her ability to function in a work setting.  Patients demographics and history impact her recovery in the following ways:  None reported.  Patient reports engaging in the following recreation/leisure activities while using:  None.  Patient reports the following people are supportive of recovery: \"My boyfriend, friends and family members\"    Patient does not have a history of gambling concerns and/or treatment.  Patient does not have other addictive behaviors she is concerned about.        Dimension Scale Ratings:    Dimension 1 -  Acute Intoxication/Withdrawal: 0 - No Problem  Dimension 2 - Biomedical: 0 - No Problem  Dimension 3 - Emotional/Behavioral/Cognitive Conditions: 1 - Minor Problem  Dimension 4 - Readiness to Change:  1 - Minor Problem  Dimension 5 - Relapse/Continued Use/ Continued Problem Potential: 1 - Minor Problem  Dimension 6 - Recovery Environment:  1 - Minor Problem    Significant Losses / Trauma / Abuse / Neglect Issues:   Patient did not serve in the .  There are indications or report of significant loss, trauma, abuse or neglect issues related to: death of my brother and sister.  Concerns for possible neglect are not present. NA    Safety Assessment:   Current Safety Concerns:  [place] Suicide Severity Rating Scale (Lifetime/Recent)  [place] Suicide Severity Rating (Lifetime/Recent) 8/23/2021   1. Wish to be Dead (Lifetime) No   1. Wish to be Dead (Recent) No   2. Non-Specific Active Suicidal Thoughts (Lifetime) No   2. Non-Specific Active Suicidal Thoughts (Recent) No   3. Active Suicidal Ideation with any Methods (Not Plan) Without Intent to Act (Lifetime) No   3. Active Suicidal Ideation with any Methods (Not Plan) Without Intent to Act (Recent) No   4. Active Suicidal Ideation with Some Intent to " Act, Without Specific Plan (Lifetime) No   4. Active Suicidal Ideation with Some Intent to Act, Without Specific Plan (Recent) No   5. Active Suicidal Ideation with Specific Plan and Intent (Lifetime) No   5. Active Suicidal Ideation with Specific Plan and Intent (Recent) No   Most Severe Ideation Rating (Lifetime) NA   Most Severe Ideation Description (Lifetime) NA   Frequency (Lifetime) NA   Duration (Lifetime) NA   Controllability (Lifetime) NA   Protective Factors  (Lifetime) NA   Reasons for Ideation (Lifetime) 0   Most Severe Ideation Rating (Past Month) NA   Most Severe Ideation Description (Past Month) NA   Frequency (Past Month) NA   Duration (Past Month) NA   Controllability (Past Month) NA   Protective Factors (Past Month) NA   Reasons for Ideation (Past Month) 0   Actual Attempt (Lifetime) No   Actual Attempt (Past 3 Months) No   Has subject engaged in non-suicidal self-injurious behavior? (Lifetime) No   Has subject engaged in non-suicidal self-injurious behavior? (Past 3 Months) No   Interrupted Attempts (Lifetime) No   Interrupted Attempts (Past 3 Months) No   Aborted or Self-Interrupted Attempt (Lifetime) No   Aborted or Self-Interrupted Attempt (Past 3 Months) No   Preparatory Acts or Behavior (Lifetime) No   Preparatory Acts or Behavior (Past 3 Months) No   Most Recent Attempt Actual Lethality Code NA   Most Lethal Attempt Actual Lethality Code NA   Initial/First Attempt Actual Lethality Code NA     Patient denies current homicidal ideation and behaviors.  Patient denies current self-injurious ideation and behaviors.    Patient denied risk behaviors associated with substance use.  Patient denies any high risk behaviors associated with mental health symptoms.  Patient reports the following current concerns for her personal safety: None.  Patient reports there are not firearms in the house.       None.    History of Safety Concerns:  Patient denied a history of homicidal ideation.     Patient denied  a history of personal safety concerns.    Patient denied a history of assaultive behaviors.    Patient denied a history of sexual assault behaviors.     Patient reported a history unsafe motor vehicle operation associated with substance use.  Patient denies any history of high risk behaviors associated with mental health symptoms.  Patient reports the following protective factors: forward or future oriented thinking;dedication to family or friends;safe and stable environment;effectively controls impulses;sense of belonging;purpose;help seeking behaviors when distressed;daily obligations;structured day;effective problem solving skills;commitment to well being;sense of meaning;positive social skills;strong sense of self worth or esteem;sense of personal control or determination    Risk Plan:  See Recommendations for Safety and Risk Management Plan    Review of Symptoms per patient report:  Substance Use:  substance related legal problems and driving under the influence     Diagnostic Criteria:  OP BEH CALI CRITERIA: Substance is often taken in larger amounts or over a longer period than was intended.  Met for:  Alcohol, There is persistent desire or unsuccessful efforts to cut down or control use of the substance.  Met for:  Alcohol, Continued use of the substance despite having persistent or recurrent social or interpersonal problems caused or exacerbated by the effects of its use.  Met for:  Alcohol, Recurrent use of the substance in which it is physically hazardous.  Met for:  Alcohol, Use of the substance is continued despite knowledge of having a persistent or recurrent physical or psychological problem that is likely to have been cause or exacerbated by the substance.  Met for:  Alcohol          Collateral Contact Summary:   Collateral contacts contributing to this assessment:  NA    If court related records were reviewed, summarize here: NA    Information from collateral contacts supported/largely agreed with  information from the client and associated risk ratings.    Information in this assessment was obtained from the medical record and provided by patient who is a fair historian.    Patient will have open access to their mental health medical record.    Diagnostic Criteria: 1.) Alcohol/drug is often taken in larger amounts or over a longer period than was intended.  Met for Alcohol.  2.) There is a persistent desire or unsuccessful efforts to cut down or control alcohol/drug use.  Met for Alcohol.  6.) Continued alcohol use despite having persistent or recurrent social or interpersonal problems caused or exacerbated by the effects of alcohol/drug.  Met for Alcohol.  8.) Recurrent alcohol/drug use in situations in which it is physically hazardous.  Met for Alcohol.  9.) Alcohol/drug use is continued despite knowledge of having a persistent or recurrent physical or psychological problem that is likely to have been caused or exacerbated by alcohol.  Met for Alcohol.      As evidenced by self report and criteria, client meets the following DSM5 Diagnoses:   (Sustained by DSM5 Criteria Listed Above)  Alcohol Use Disorder   305.00 (F10.10) Mild Sustained.    Recommendations:     1. Plan for Safety and Risk Management:  Recommended that patient call 911 or go to the local ED should there be a change in any of these risk factors..      Report to child / adult protection services was NA.     2. CALI Referrals:   Recommendations:    1) Refrain from drinking and driving.     2)  Remain law abiding and follow all recommendations of probation.    Patient reports that she is willing to follow these recommendations.  Patient would like the following family or other support people involved in her treatment:  None. Patient does not have a history of opiate use.    3. [place]:  Record has been saved! Assessment ID: [id number]     4. Recommendations for treatment focus:   Alcohol / Substance Use - Alcohol.                      Provider Name/  Credentials:  Curly Sequeira MA, [facility]  Substance Use Assessment  Phone: [phone]  Fax: [phone]    August 23, 2021

## 2021-08-31 NOTE — PROGRESS NOTES
[facility] Dermatology Note  Encounter Date: Sep 1, 2021  Office Visit     Dermatology Problem List:  0. NUB - left medial thigh, bx 9/1/21  1. History of benign biopsy  - Left cheek, intradermal nevus, s/p bx 5/20/15  - Left chin, compound nevus, s/p bx 5/20/15  - Under right chin, intradermal nevus, s/p bx 5/20/15 --> grew back, referral to Dr. Mcmillan for excision    Social history: Not pregnant or breastfeeding. 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  ____________________________________________    ASSESSMENT/PLAN:    # Neoplasm of uncertain behavior on the left medial thigh. The differential diagnosis includes irritated nevus vs other.  - See procedure note below.    # Recurrent compound nevus, right chin.  - Discussed if patient would like this removed, she would be trading this out for a scar. The scar likely would look worse cosmetically than the mole. Despite conversation, she did wish to proceed with excisional removal.  - Referral to Dr. Mcmillan for excision.    Procedures Performed:   - Shave biopsy procedure note, location: left medial thigh. After discussion of benefits and risks including but not limited to bleeding, infection, scar, incomplete removal, recurrence, and non-diagnostic biopsy, written consent and photographs were obtained. The area was cleaned with isopropyl alcohol. 0.5mL of 1% lidocaine with epinephrine was injected to obtain adequate anesthesia of lesion. Shave biopsy at site performed. Hemostasis was achieved with aluminium chloride. Petrolatum ointment and a sterile dressing were applied. The patient tolerated the procedure and no complications were noted. The patient was provided with verbal and written post care instructions.     Follow-up: pending path results    Staff and Scribe:     Scribe Disclosure:   Mireya TORREZ, am serving as a scribe to document services personally performed by this physician,  "Dr. Ayleen Rico, based on data collection and the provider's statements to me.     Provider Disclosure:   The documentation recorded by the scribe accurately reflects the services I personally performed and the decisions made by me.    Ayleen Rico MD    Department of Dermatology  [facility]: Phone: [phone], Fax:[phone]  [facility]: Phone: [phone], Fax: [phone]    ____________________________________________    CC: Derm Problem (mole check left thig. mole on right chin that was removed in 2015 and came back)    HPI:  Ms. Satinder Avalos is a(n) 30 year old female who presents today as a new patient for spot check.    She is new to our department. She has been seen at [facility]. She has no history of skin cancer or precancerous lesions to her knowledge. Does have history of atypical nevus.    Referred by PCP for atypical mole on 6/10/21. Note reviewed. States \"left thigh raised mole darker brown,  Also one one on chin that had been removed before and grew back.\"    Today, patient notes concerns about lesions on the left thigh and the right chin. Lesion on left thigh sometimes gets caught in the underwear. Would like removed today.    Patient is otherwise feeling well, without additional concerns.    Labs:  NA    Physical exam:  Vitals: There were no vitals taken for this visit.  SKIN: Focused examination of the face and left upper leg was performed.  - Left medial thigh: brown pigmented macule  - Right chin: brown macule at previous biopsy site  - No other lesions of concern on areas examined.     Medications:  Current Outpatient Medications   Medication     clindamycin (CLEOCIN) 2 % vaginal cream     EPINEPHrine (EPIPEN 2-LEOBARDO) 0.3 MG/0.3ML injection 2-pack     norelgestromin-ethinyl estradiol (ORTHO EVRA) 150-35 MCG/24HR patch     No current " facility-administered medications for this visit.      Past Medical History:   Patient Active Problem List   Diagnosis     Chronic left SI joint pain     CARDIOVASCULAR SCREENING; LDL GOAL LESS THAN 160     Bacterial vaginosis     Drug allergy     Dysplasia of cervix, high grade ELICEO 2     Elevated BP     Obesity     Flu vaccine need     History of cervical LEEP biopsy affecting care of mother, antepartum     Diet controlled gestational diabetes mellitus (GDM), antepartum     Encounter for initial prescription of transdermal patch hormonal contraceptive device     Past Medical History:   Diagnosis Date     Abnormal Pap smear of cervix 03/07/2013, 2018    see problem list     PID (acute pelvic inflammatory disease)     age 10        CC Brenna Yusuf, APRN CNP  [address] [place] [address] on close of this encounter.

## 2021-09-01 ENCOUNTER — OFFICE VISIT (OUTPATIENT)
Dept: DERMATOLOGY | Facility: CLINIC | Age: 31
End: 2021-09-01
Attending: NURSE PRACTITIONER
Payer: COMMERCIAL

## 2021-09-01 DIAGNOSIS — D22.9 BENIGN NEVUS: ICD-10-CM

## 2021-09-01 DIAGNOSIS — D48.5 NEOPLASM OF UNCERTAIN BEHAVIOR OF SKIN: Primary | ICD-10-CM

## 2021-09-01 PROCEDURE — 11102 TANGNTL BX SKIN SINGLE LES: CPT | Performed by: DERMATOLOGY

## 2021-09-01 PROCEDURE — 88305 TISSUE EXAM BY PATHOLOGIST: CPT | Performed by: DERMATOLOGY

## 2021-09-01 PROCEDURE — 99202 OFFICE O/P NEW SF 15 MIN: CPT | Mod: 25 | Performed by: DERMATOLOGY

## 2021-09-01 NOTE — NURSING NOTE
The following medication was given:     MEDICATION:  Lidocaine with epinephrine 1% 1:[id number]  ROUTE: [place]  SITE: see procedure note  DOSE: see procedure note  LOT #: [id number]  : [employer]  EXPIRATION DATE: 6/1/2022  NDC#: 7060-7495-72     Was there drug waste? No  Multi-dose vial: Yes    Jacki Coleman LPN  September 1, 2021

## 2021-09-01 NOTE — PATIENT INSTRUCTIONS

## 2021-09-01 NOTE — LETTER
9/1/2021         RE: Satinder Avalos  [address]  [place] [facility] [address]        Dear Colleague,    Thank you for referring your patient, Satinder Avalos, to the [facility]. Please see a copy of my visit note below.    [facility] Dermatology Note  Encounter Date: Sep 1, 2021  Office Visit     Dermatology Problem List:  0. NUB - left medial thigh, bx 9/1/21  1. History of benign biopsy  - Left cheek, intradermal nevus, s/p bx 5/20/15  - Left chin, compound nevus, s/p bx 5/20/15  - Under right chin, intradermal nevus, s/p bx 5/20/15 --> grew back, referral to Dr. Mcmillan for excision    Social history: Not pregnant or breastfeeding. 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  ____________________________________________    ASSESSMENT/PLAN:    # Neoplasm of uncertain behavior on the left medial thigh. The differential diagnosis includes irritated nevus vs other.  - See procedure note below.    # Recurrent compound nevus, right chin.  - Discussed if patient would like this removed, she would be trading this out for a scar. The scar likely would look worse cosmetically than the mole. Despite conversation, she did wish to proceed with excisional removal.  - Referral to Dr. Mcmillan for excision.    Procedures Performed:   - Shave biopsy procedure note, location: left medial thigh. After discussion of benefits and risks including but not limited to bleeding, infection, scar, incomplete removal, recurrence, and non-diagnostic biopsy, written consent and photographs were obtained. The area was cleaned with isopropyl alcohol. 0.5mL of 1% lidocaine with epinephrine was injected to obtain adequate anesthesia of lesion. Shave biopsy at site performed. Hemostasis was achieved with aluminium chloride. Petrolatum ointment and a sterile dressing were applied. The patient tolerated the procedure and no complications were  "noted. The patient was provided with verbal and written post care instructions.     Follow-up: pending path results    Staff and Scribe:     Scribe Disclosure:   I, Mireya Lobo, am serving as a scribe to document services personally performed by this physician, Dr. Ayleen Rico, based on data collection and the provider's statements to me.     Provider Disclosure:   The documentation recorded by the scribe accurately reflects the services I personally performed and the decisions made by me.    Ayleen Rico MD    Department of Dermatology  [facility]: Phone: [phone], Fax:[phone]  [facility]: Phone: [phone], Fax: [phone]    ____________________________________________    CC: Derm Problem (mole check left thig. mole on right chin that was removed in 2015 and came back)    HPI:  Ms. Satinder Avalos is a(n) 30 year old female who presents today as a new patient for spot check.    She is new to our department. She has been seen at [facility]. She has no history of skin cancer or precancerous lesions to her knowledge. Does have history of atypical nevus.    Referred by PCP for atypical mole on 6/10/21. Note reviewed. States \"left thigh raised mole darker brown,  Also one one on chin that had been removed before and grew back.\"    Today, patient notes concerns about lesions on the left thigh and the right chin. Lesion on left thigh sometimes gets caught in the underwear. Would like removed today.    Patient is otherwise feeling well, without additional concerns.    Labs:  NA    Physical exam:  Vitals: There were no vitals taken for this visit.  SKIN: Focused examination of the face and left upper leg was performed.  - Left medial thigh: brown pigmented macule  - Right chin: brown macule at previous biopsy site  - No other lesions of concern on areas " examined.     Medications:  Current Outpatient Medications   Medication     clindamycin (CLEOCIN) 2 % vaginal cream     EPINEPHrine (EPIPEN 2-LEOBARDO) 0.3 MG/0.3ML injection 2-pack     norelgestromin-ethinyl estradiol (ORTHO EVRA) 150-35 MCG/24HR patch     No current facility-administered medications for this visit.      Past Medical History:   Patient Active Problem List   Diagnosis     Chronic left SI joint pain     CARDIOVASCULAR SCREENING; LDL GOAL LESS THAN 160     Bacterial vaginosis     Drug allergy     Dysplasia of cervix, high grade ELICEO 2     Elevated BP     Obesity     Flu vaccine need     History of cervical LEEP biopsy affecting care of mother, antepartum     Diet controlled gestational diabetes mellitus (GDM), antepartum     Encounter for initial prescription of transdermal patch hormonal contraceptive device     Past Medical History:   Diagnosis Date     Abnormal Pap smear of cervix 03/07/2013, 2018    see problem list     PID (acute pelvic inflammatory disease)     age 10        CC Brenna Yusuf, APRN CNP  [address] [place] [address] on close of this encounter.      Again, thank you for allowing me to participate in the care of your patient.        Sincerely,        Ayleen Rico MD

## 2021-09-01 NOTE — NURSING NOTE
Satinder Avalos's goals for this visit include:   Chief Complaint   Patient presents with     Derm Problem     mole check left thig. mole on right chin that was removed in 2015 and came back       She requests these members of her care team be copied on today's visit information: no    PCP: No Ref-Primary, Physician    Referring Provider:  MAGDALENA Schumacher CNP  [address] [place] [address]    There were no vitals taken for this visit.    Do you need any medication refills at today's visit? No    Jacki Coleman LPN

## 2021-09-03 ENCOUNTER — TELEPHONE (OUTPATIENT)
Dept: DERMATOLOGY | Facility: CLINIC | Age: 31
End: 2021-09-03

## 2021-09-03 LAB
PATH REPORT.COMMENTS IMP SPEC: NORMAL
PATH REPORT.COMMENTS IMP SPEC: NORMAL
PATH REPORT.FINAL DX SPEC: NORMAL
PATH REPORT.GROSS SPEC: NORMAL
PATH REPORT.MICROSCOPIC SPEC OTHER STN: NORMAL
PATH REPORT.RELEVANT HX SPEC: NORMAL

## 2021-09-03 NOTE — TELEPHONE ENCOUNTER
Result Notes     Kimberley Walton LPN   9/3/2021 11:49 AM CDT Back to Top      I spoke to Satinder Avalos and gave results. Patient understood and had no further questions or concerns.     RASHID Govea Lori, MD   9/3/2021 11:45 AM CDT       Pleae let patient know that mole removed was a normal one, as we expected. Nothing further to do at this time.     Ayleen Rico MD    Department of Dermatology  [facility]: Phone: [phone], Fax:[phone]  [facility]: Phone: [phone], Fax: [phone]

## 2021-10-23 ENCOUNTER — HEALTH MAINTENANCE LETTER (OUTPATIENT)
Age: 31
End: 2021-10-23

## 2022-01-18 ENCOUNTER — PATIENT OUTREACH (OUTPATIENT)
Dept: OBGYN | Facility: CLINIC | Age: 32
End: 2022-01-18

## 2022-01-18 DIAGNOSIS — N87.1 DYSPLASIA OF CERVIX, HIGH GRADE CIN 2: ICD-10-CM

## 2022-03-15 NOTE — TELEPHONE ENCOUNTER
FYI to provider - Patient is lost to pap tracking follow-up. Attempts to contact pt have been made per reminder process and there has been no reply and/or no appt scheduled.       2/2/21 NIL pap, Neg HPV. Plan: cotest in 1 yr, due 2/2/22 1/18/22 Reminder mychart  2/15/22 Reminder call-LM  3/15/22 Lost to follow-up for pap tracking

## 2022-07-30 ENCOUNTER — HEALTH MAINTENANCE LETTER (OUTPATIENT)
Age: 32
End: 2022-07-30

## 2022-10-10 ENCOUNTER — HEALTH MAINTENANCE LETTER (OUTPATIENT)
Age: 32
End: 2022-10-10

## 2023-08-19 ENCOUNTER — HEALTH MAINTENANCE LETTER (OUTPATIENT)
Age: 33
End: 2023-08-19

## 2024-04-02 SDOH — HEALTH STABILITY: PHYSICAL HEALTH: ON AVERAGE, HOW MANY DAYS PER WEEK DO YOU ENGAGE IN MODERATE TO STRENUOUS EXERCISE (LIKE A BRISK WALK)?: 3 DAYS

## 2024-04-02 SDOH — HEALTH STABILITY: PHYSICAL HEALTH: ON AVERAGE, HOW MANY MINUTES DO YOU ENGAGE IN EXERCISE AT THIS LEVEL?: 30 MIN

## 2024-04-02 ASSESSMENT — SOCIAL DETERMINANTS OF HEALTH (SDOH): HOW OFTEN DO YOU GET TOGETHER WITH FRIENDS OR RELATIVES?: TWICE A WEEK

## 2024-04-08 ENCOUNTER — OFFICE VISIT (OUTPATIENT)
Dept: FAMILY MEDICINE | Facility: CLINIC | Age: 34
End: 2024-04-08
Payer: COMMERCIAL

## 2024-04-08 VITALS
BODY MASS INDEX: 31.89 KG/M2 | WEIGHT: 180 LBS | OXYGEN SATURATION: 99 % | RESPIRATION RATE: 16 BRPM | HEART RATE: 78 BPM | HEIGHT: 63 IN | SYSTOLIC BLOOD PRESSURE: 136 MMHG | DIASTOLIC BLOOD PRESSURE: 88 MMHG | TEMPERATURE: 98.5 F

## 2024-04-08 DIAGNOSIS — Z11.3 SCREENING FOR STD (SEXUALLY TRANSMITTED DISEASE): ICD-10-CM

## 2024-04-08 DIAGNOSIS — Z13.220 SCREENING FOR LIPID DISORDERS: ICD-10-CM

## 2024-04-08 DIAGNOSIS — Z00.00 ROUTINE GENERAL MEDICAL EXAMINATION AT A HEALTH CARE FACILITY: ICD-10-CM

## 2024-04-08 DIAGNOSIS — Z12.4 CERVICAL CANCER SCREENING: Primary | ICD-10-CM

## 2024-04-08 DIAGNOSIS — L21.9 SEBORRHEIC DERMATITIS: ICD-10-CM

## 2024-04-08 DIAGNOSIS — Z13.1 SCREENING FOR DIABETES MELLITUS: ICD-10-CM

## 2024-04-08 DIAGNOSIS — E66.09 CLASS 1 OBESITY DUE TO EXCESS CALORIES WITH SERIOUS COMORBIDITY AND BODY MASS INDEX (BMI) OF 31.0 TO 31.9 IN ADULT: ICD-10-CM

## 2024-04-08 DIAGNOSIS — E66.811 CLASS 1 OBESITY DUE TO EXCESS CALORIES WITH SERIOUS COMORBIDITY AND BODY MASS INDEX (BMI) OF 31.0 TO 31.9 IN ADULT: ICD-10-CM

## 2024-04-08 PROBLEM — O24.410 DIET CONTROLLED GESTATIONAL DIABETES MELLITUS (GDM), ANTEPARTUM: Status: RESOLVED | Noted: 2019-06-14 | Resolved: 2024-04-08

## 2024-04-08 PROBLEM — N87.9 CERVICAL DYSPLASIA: Status: ACTIVE | Noted: 2018-07-30

## 2024-04-08 LAB
C TRACH DNA SPEC QL PROBE+SIG AMP: NEGATIVE
CHOLEST SERPL-MCNC: 172 MG/DL
FASTING STATUS PATIENT QL REPORTED: YES
FASTING STATUS PATIENT QL REPORTED: YES
GLUCOSE SERPL-MCNC: 97 MG/DL (ref 70–99)
HDLC SERPL-MCNC: 57 MG/DL
HIV 1+2 AB+HIV1 P24 AG SERPL QL IA: NONREACTIVE
LDLC SERPL CALC-MCNC: 102 MG/DL
N GONORRHOEA DNA SPEC QL NAA+PROBE: NEGATIVE
NONHDLC SERPL-MCNC: 115 MG/DL
T PALLIDUM AB SER QL: NONREACTIVE
TRIGL SERPL-MCNC: 64 MG/DL

## 2024-04-08 PROCEDURE — 90715 TDAP VACCINE 7 YRS/> IM: CPT | Performed by: PHYSICIAN ASSISTANT

## 2024-04-08 PROCEDURE — 87591 N.GONORRHOEAE DNA AMP PROB: CPT | Performed by: PHYSICIAN ASSISTANT

## 2024-04-08 PROCEDURE — 36415 COLL VENOUS BLD VENIPUNCTURE: CPT | Performed by: PHYSICIAN ASSISTANT

## 2024-04-08 PROCEDURE — 82947 ASSAY GLUCOSE BLOOD QUANT: CPT | Performed by: PHYSICIAN ASSISTANT

## 2024-04-08 PROCEDURE — 99395 PREV VISIT EST AGE 18-39: CPT | Mod: 25 | Performed by: PHYSICIAN ASSISTANT

## 2024-04-08 PROCEDURE — 87624 HPV HI-RISK TYP POOLED RSLT: CPT | Performed by: PHYSICIAN ASSISTANT

## 2024-04-08 PROCEDURE — 87491 CHLMYD TRACH DNA AMP PROBE: CPT | Performed by: PHYSICIAN ASSISTANT

## 2024-04-08 PROCEDURE — G0145 SCR C/V CYTO,THINLAYER,RESCR: HCPCS | Performed by: PHYSICIAN ASSISTANT

## 2024-04-08 PROCEDURE — 80061 LIPID PANEL: CPT | Performed by: PHYSICIAN ASSISTANT

## 2024-04-08 PROCEDURE — 99213 OFFICE O/P EST LOW 20 MIN: CPT | Mod: 25 | Performed by: PHYSICIAN ASSISTANT

## 2024-04-08 PROCEDURE — 86780 TREPONEMA PALLIDUM: CPT | Performed by: PHYSICIAN ASSISTANT

## 2024-04-08 PROCEDURE — 87389 HIV-1 AG W/HIV-1&-2 AB AG IA: CPT | Performed by: PHYSICIAN ASSISTANT

## 2024-04-08 PROCEDURE — 90471 IMMUNIZATION ADMIN: CPT | Performed by: PHYSICIAN ASSISTANT

## 2024-04-08 RX ORDER — TRIAMCINOLONE ACETONIDE 1 MG/G
CREAM TOPICAL
Qty: 15 G | Refills: 0 | Status: SHIPPED | OUTPATIENT
Start: 2024-04-08

## 2024-04-08 ASSESSMENT — PAIN SCALES - GENERAL: PAINLEVEL: NO PAIN (0)

## 2024-04-08 NOTE — NURSING NOTE
Prior to immunization administration, verified patients identity using patient s name and date of birth. Please see Immunization Activity for additional information.     Screening Questionnaire for Adult Immunization    Are you sick today?   No   Do you have allergies to medications, food, a vaccine component or latex?   Yes   Have you ever had a serious reaction after receiving a vaccination?   No   Do you have a long-term health problem with heart, lung, kidney, or metabolic disease (e.g., diabetes), asthma, a blood disorder, no spleen, complement component deficiency, a cochlear implant, or a spinal fluid leak?  Are you on long-term aspirin therapy?   No   Do you have cancer, leukemia, HIV/AIDS, or any other immune system problem?   No   Do you have a parent, brother, or sister with an immune system problem?   No   In the past 3 months, have you taken medications that affect  your immune system, such as prednisone, other steroids, or anticancer drugs; drugs for the treatment of rheumatoid arthritis, Crohn s disease, or psoriasis; or have you had radiation treatments?   No   Have you had a seizure, or a brain or other nervous system problem?   No   During the past year, have you received a transfusion of blood or blood    products, or been given immune (gamma) globulin or antiviral drug?   No   For women: Are you pregnant or is there a chance you could become       pregnant during the next month?   No   Have you received any vaccinations in the past 4 weeks?   No     Immunization questionnaire was positive for at least one answer.  Notified Provider.      Patient instructed to remain in clinic for 15 minutes afterwards, and to report any adverse reactions.     Screening performed by Wilder Worley on 4/8/2024 at 9:00 AM.

## 2024-04-08 NOTE — PATIENT INSTRUCTIONS
Preventive Care Advice   This is general advice given by our system to help you stay healthy. However, your care team may have specific advice just for you. Please talk to your care team about your preventive care needs.  Nutrition  Eat 5 or more servings of fruits and vegetables each day.  Try wheat bread, brown rice and whole grain pasta (instead of white bread, rice, and pasta).  Get enough calcium and vitamin D. Check the label on foods and aim for 100% of the RDA (recommended daily allowance).  Lifestyle  Exercise at least 150 minutes each week   (30 minutes a day, 5 days a week).  Do muscle strengthening activities 2 days a week. These help control your weight and prevent disease.  No smoking.  Wear sunscreen to prevent skin cancer.  Have a dental exam and cleaning every 6 months.  Yearly exams  See your health care team every year to talk about:  Any changes in your health.  Any medicines your care team has prescribed.  Preventive care, family planning, and ways to prevent chronic diseases.  Shots (vaccines)   HPV shots (up to age 26), if you've never had them before.  Hepatitis B shots (up to age 59), if you've never had them before.  COVID-19 shot: Get this shot when it's due.  Flu shot: Get a flu shot every year.  Tetanus shot: Get a tetanus shot every 10 years.  Pneumococcal, hepatitis A, and RSV shots: Ask your care team if you need these based on your risk.  Shingles shot (for age 50 and up).  General health tests  Diabetes screening:  Starting at age 35, Get screened for diabetes at least every 3 years.  If you are younger than age 35, ask your care team if you should be screened for diabetes.  Cholesterol test: At age 39, start having a cholesterol test every 5 years, or more often if advised.  Bone density scan (DEXA): At age 50, ask your care team if you should have this scan for osteoporosis (brittle bones).  Hepatitis C: Get tested at least once in your life.  STIs (sexually transmitted  infections)  Before age 24: Ask your care team if you should be screened for STIs.  After age 24: Get screened for STIs if you're at risk. You are at risk for STIs (including HIV) if:  You are sexually active with more than one person.  You don't use condoms every time.  You or a partner was diagnosed with a sexually transmitted infection.  If you are at risk for HIV, ask about PrEP medicine to prevent HIV.  Get tested for HIV at least once in your life, whether you are at risk for HIV or not.  Cancer screening tests  Cervical cancer screening: If you have a cervix, begin getting regular cervical cancer screening tests at age 21. Most people who have regular screenings with normal results can stop after age 65. Talk about this with your provider.  Breast cancer scan (mammogram): If you've ever had breasts, begin having regular mammograms starting at age 40. This is a scan to check for breast cancer.  Colon cancer screening: It is important to start screening for colon cancer at age 45.  Have a colonoscopy test every 10 years (or more often if you're at risk) Or, ask your provider about stool tests like a FIT test every year or Cologuard test every 3 years.  To learn more about your testing options, visit: https://www.[employer]/258688.pdf.  For help making a decision, visit: https://bit.ly/rr85959.  Prostate cancer screening test: If you have a prostate and are age 55 to 69, ask your provider if you would benefit from a yearly prostate cancer screening test.  Lung cancer screening: If you are a current or former smoker age 50 to 80, ask your care team if ongoing lung cancer screenings are right for you.  For informational purposes only. Not to replace the advice of your health care provider. Copyright   2023 [employer]. All rights reserved. Clinically reviewed by the [facility]. [employer] 271841 - REV 01/24.    Eating Healthy Foods: Care Instructions  With every meal, you  "can make healthy food choices. Try to eat a variety of fruits, vegetables, whole grains, lean proteins, and low-fat dairy products. This can help you get the right balance of nutrients, including vitamins and minerals. Small changes add up over time. You can start by adding one healthy food to your meals each day.    Try to make half your plate fruits and vegetables, one-fourth whole grains, and one-fourth lean proteins. Try including dairy with your meals.   Eat more fruits and vegetables. Try to have them with most meals and snacks.   Foods for healthy eating    Fruits    These can be fresh, frozen, canned, or dried.  Try to choose whole fruit rather than fruit juice.  Eat a variety of colors.    Vegetables    These can be fresh, frozen, canned, or dried.  Beans, peas, and lentils count too.    Whole grains    Choose whole-grain breads, cereals, and noodles.  Try brown rice.    Lean proteins    These can include lean meat, poultry, fish, and eggs.  You can also have tofu, beans, peas, lentils, nuts, and seeds.    Dairy    Try milk, yogurt, and cheese.  Choose low-fat or fat-free when you can.  If you need to, use lactose-free milk or fortified plant-based milk products, such as soy milk.    Water    Drink water when you're thirsty.  Limit sugar-sweetened drinks, including soda, fruit drinks, and sports drinks.  Where can you learn more?  Go to https://www.[employer].net/patiented  Enter T756 in the search box to learn more about \"Eating Healthy Foods: Care Instructions.\"  Current as of: September 20, 2023               Content Version: 14.0    6352-6404 [employer].   Care instructions adapted under license by your healthcare professional. If you have questions about a medical condition or this instruction, always ask your healthcare professional. [employer] disclaims any warranty or liability for your use of this information.      "

## 2024-04-08 NOTE — PROGRESS NOTES
"Preventive Care Visit  [facility]  LIZANDRO Steen, Physician Assistant - Medical  Apr 8, 2024      Assessment & Plan     Routine general medical examination at a health care facility  Repeat 1 year     Cervical cancer screening  - Pap Screen with HPV - recommended age 30 - 65 years    Screening for diabetes mellitus  - Glucose; Future  - Glucose    Screening for lipid disorders  - Lipid panel reflex to direct LDL Fasting; Future  - Lipid panel reflex to direct LDL Fasting    Screening for STD (sexually transmitted disease)  - Chlamydia trachomatis/Neisseria gonorrhoeae by PCR - Clinic Collect  - Treponema Abs w Reflex to RPR and Titer; Future  - HIV Antigen Antibody Combo; Future  - Treponema Abs w Reflex to RPR and Titer  - HIV Antigen Antibody Combo    Seborrheic dermatitis  Discussed to continue with the antifungal shampoo in addition to the steroid cream. If no improvement in the next month please follow up in clinic to recheck or sooner if needed. Patient agree's with this plan and has no further questions  - triamcinolone (KENALOG) 0.1 % external cream; Twice a day for 2 weeks, then once a day for 2 weeks then stop    Class 1 obesity due to excess calories with serious comorbidity and body mass index (BMI) of 31.0 to 31.9 in adult  Diet and exercise discussed    Patient has been advised of split billing requirements and indicates understanding: Yes          BMI  Estimated body mass index is 31.7 kg/m  as calculated from the following:    Height as of this encounter: 1.605 m (5' 3.19\").    Weight as of this encounter: 81.6 kg (180 lb).       Counseling  Appropriate preventive services were discussed with this patient, including applicable screening as appropriate for fall prevention, nutrition, physical activity, Tobacco-use cessation, weight loss and cognition.  Checklist reviewing preventive services available has been given to the patient.  Reviewed patient's diet, addressing " concerns and/or questions.   She is at risk for lack of exercise and has been provided with information to increase physical activity for the benefit of her well-being.   She is at risk for psychosocial distress and has been provided with information to reduce risk.           Delio Rajan is a 33 year old, presenting for the following:  Physical (/)         Health Care Directive  Patient does not have a Health Care Directive or Living Will: Discussed advance care planning with patient; however, patient declined at this time.    HPI    Concern   Description: Red irritated spots around hairline.  Started about 3-4 months ago. Her scalp was dry at first. Then the redness started. It has not been getting worse but not better. They itch. No new face wash or shampoos. She  has been doing a strong Head and Shoulders.         4/2/2024   General Health   How would you rate your overall physical health? Good    Good   Feel stress (tense, anxious, or unable to sleep) Only a little    To some extent   (!) STRESS CONCERN      4/2/2024   Nutrition   Three or more servings of calcium each day? Yes    Yes   Diet: I don't know    I don't know   How many servings of fruit and vegetables per day? (!) 2-3    (!) 2-3   How many sweetened beverages each day? (!) 2    (!) 2         4/2/2024   Exercise   Days per week of moderate/strenous exercise 3 days    3 days   Average minutes spent exercising at this level 30 min    30 min         4/2/2024   Social Factors   Frequency of gathering with friends or relatives Twice a week    Once a week   Worry food won't last until get money to buy more No    No   Food not last or not have enough money for food? No    No   Do you have housing?  Yes    Yes   Are you worried about losing your housing? No    No   Lack of transportation? No    No   Unable to get utilities (heat,electricity)? No    No         4/2/2024   Dental   Dentist two times every year? Yes    Yes         4/2/2024   TB  "Screening   Were you born outside of the [place]? No           Today's PHQ-2 Score:       4/2/2024     6:32 AM   PHQ-2 ( 1999 [employer])   Q1: Little interest or pleasure in doing things 0   Q2: Feeling down, depressed or hopeless 0   PHQ-2 Score 0   Q1: Little interest or pleasure in doing things Not at all   Q2: Feeling down, depressed or hopeless Not at all   PHQ-2 Score 0         4/2/2024   Substance Use   Alcohol more than 3/day or more than 7/wk No    No   Do you use any other substances recreationally? (!) CANNABIS PRODUCTS    (!) CANNABIS PRODUCTS     Social History     Tobacco Use    Smoking status: Never     Passive exposure: Never    Smokeless tobacco: Never   Vaping Use    Vaping Use: Never used   Substance Use Topics    Alcohol use: Yes     Comment: very rare    Drug use: Yes     Types: Marijuana     Comment: stopped with pregnancy          Mammogram Screening - Patient under 40 years of age: Routine Mammogram Screening not recommended.         4/2/2024   STI Screening   New sexual partner(s) since last STI/HIV test? No    No     History of abnormal Pap smear: YES - updated in Problem List and Health Maintenance accordingly        Latest Ref Rng & Units 2/2/2021     2:42 PM 2/2/2021     2:40 PM 2/20/2019    10:00 AM   PAP / HPV   PAP (Historical)   NIL  NIL    HPV 16 DNA NEG^Negative Negative      HPV 18 DNA NEG^Negative Negative      Other HR HPV NEG^Negative Negative              4/2/2024   Contraception/Family Planning   Questions about contraception or family planning No    No        Reviewed and updated as needed this visit by Provider                          Review of Systems  Constitutional, HEENT, cardiovascular, pulmonary, GI, , musculoskeletal, neuro, skin, endocrine and psych systems are negative, except as otherwise noted.     Objective    Exam  LMP 03/12/2024 (Approximate)    Estimated body mass index is 31.46 kg/m  as calculated from the following:    Height as of 8/23/21: 1.575 m (5' 2\").    " Weight as of 8/23/21: 78 kg (172 lb).    Physical Exam  GENERAL: alert and no distress  EYES: Eyes grossly normal to inspection, PERRL and conjunctivae and sclerae normal  HENT: ear canals and TM's normal, nose and mouth without ulcers or lesions  NECK: no adenopathy, no asymmetry, masses, or scars  RESP: lungs clear to auscultation - no rales, rhonchi or wheezes  BREAST: normal without masses, tenderness or nipple discharge and no palpable axillary masses or adenopathy  CV: regular rate and rhythm, normal S1 S2, no S3 or S4, no murmur, click or rub, no peripheral edema  ABDOMEN: soft, nontender, no hepatosplenomegaly, no masses and bowel sounds normal   (female) w/bimanual: normal female external genitalia, normal urethral meatus, normal vaginal mucosa, and normal cervix/adnexa/uterus without masses or discharge  MS: no gross musculoskeletal defects noted, no edema  SKIN: no suspicious lesions or rashes  NEURO: Normal strength and tone, mentation intact and speech normal  PSYCH: mentation appears normal, affect normal/bright        Signed Electronically by: LIZANDRO Steen

## 2024-04-10 LAB
BKR LAB AP GYN ADEQUACY: NORMAL
BKR LAB AP GYN INTERPRETATION: NORMAL
BKR LAB AP HPV REFLEX: NORMAL
BKR LAB AP PREVIOUS ABNORMAL: NORMAL
PATH REPORT.COMMENTS IMP SPEC: NORMAL
PATH REPORT.COMMENTS IMP SPEC: NORMAL
PATH REPORT.RELEVANT HX SPEC: NORMAL

## 2024-04-12 LAB
HUMAN PAPILLOMA VIRUS 16 DNA: NEGATIVE
HUMAN PAPILLOMA VIRUS 18 DNA: NEGATIVE
HUMAN PAPILLOMA VIRUS FINAL DIAGNOSIS: ABNORMAL
HUMAN PAPILLOMA VIRUS OTHER HR: POSITIVE

## 2024-04-16 ENCOUNTER — PATIENT OUTREACH (OUTPATIENT)
Dept: FAMILY MEDICINE | Facility: CLINIC | Age: 34
End: 2024-04-16
Payer: COMMERCIAL

## 2024-06-24 NOTE — PATIENT INSTRUCTIONS
If you have labs or imaging done, the results will automatically release in [employer] without an interpretation.  Your health care professional will review those results and send an interpretation with recommendations as soon as possible, but this may be 1-3 business days.    If you have any questions regarding your visit, please contact your care team.     [employer]: [phone]  [facility] CLINIC HOURS TELEPHONE NUMBER   Janes TAM Paulino .      Rebeca-CARLOS Zee-CARLOS Clark-Surgery Scheduler  Jackie-         Monday-[place]  8:00 am-4:00 pm  [facility]  [facility]00 [facility]  Wednesday-[place] 8:00 am-4:00 pm  Thursday-[place] 8:00 am-4:00 pm    Typical Surgery Day Friday [facility]  [address] [place] [address]  PH: [phone] Fax    Imaging Scheduling all locations  PH: [phone]     [facility]  [address]  [place], [place] [address] [phone]    [facility]  [address] [place] [address]  PH: [phone]     **Surgeries** Our Surgery Schedulers will contact you to schedule. If you do not receive a call within 3 business days, please call [phone].  Urgent Care locations:  [facility]       Monday-Friday   10 am - 8 pm  Saturday and Sunday   9 am - 5 pm   [phone]   If you need a medication refill, please contact your pharmacy. Please allow 3 business days for your refill to be completed.  As always, Thank you for trusting us with your healthcare needs!

## 2024-06-24 NOTE — PROGRESS NOTES
33 year old  (+ 6 adopted children) presents for colposcopy.      Indication for procedure: NILM / +HPV HR other  Prior history of cervical dysplasia: Yes   Prior Colposcopy history: Yes   Prior LEEP:Yes- patient reports in 2018    Patient's last menstrual period was 2024.    Tobacco: No  Gardasil vaccination status: Yes   Pregnancy test: Negative  She denies the possibility of pregnancy     Discussed nature of HPV related infection, natural history and association with cervical dysplasia.  Procedure for colposcopy and biopsy was explained to the patient and consent obtained.  All the patient's questions were answered.    PROCEDURE:  COLPOSCOPY  After a procedural timeout was taken, she was positioned in dorsal lithotomy and a speculum was inserted to allow visualization of the cervix. A 5% acetic acid solution was applied to the ectocervix with large swabs. Lugols solution was also applied.  Colposcopic examination was then undertaken as noted below.    FINDINGS:  Physical Exam  BP (!) 147/97   Pulse 75   Wt 82.1 kg (180 lb 14.4 oz)   LMP 2024   BMI 31.85 kg/m    WDWN, NAD  Well perfused  Non labored breathing      Procedures    Cervix: no visible lesions, no mosaicism, no punctation, and no abnormal vasculature; acetowhite epithelium noted at 6 and 12:00.  Lugol's applied with similar findings.  Colposcopic biopsies were taken at 6 and 12:00 as well as an ECC performed.  .    Vaginal inspection: vaginal colposcopy not performed and normal without visible lesions.    Vulvar colposcopy: vulvar colposcopy not performed and normal mucosa without lesions.    Procedure Summary: Patient tolerated procedure well.    Colposcopic Impression: Normal - CIN1      Plan: Specimens labelled and sent to pathology. and Will base further treatment on pathology findings..      Janes Bob DO, FACOG

## 2024-06-25 ENCOUNTER — OFFICE VISIT (OUTPATIENT)
Dept: OBGYN | Facility: CLINIC | Age: 34
End: 2024-06-25
Payer: COMMERCIAL

## 2024-06-25 VITALS
WEIGHT: 180.9 LBS | SYSTOLIC BLOOD PRESSURE: 147 MMHG | DIASTOLIC BLOOD PRESSURE: 97 MMHG | BODY MASS INDEX: 31.85 KG/M2 | HEART RATE: 75 BPM

## 2024-06-25 DIAGNOSIS — N72 HIGH RISK HUMAN PAPILLOMA VIRUS (HPV) INFECTION OF CERVIX: Primary | ICD-10-CM

## 2024-06-25 DIAGNOSIS — B97.7 HIGH RISK HUMAN PAPILLOMA VIRUS (HPV) INFECTION OF CERVIX: Primary | ICD-10-CM

## 2024-06-25 DIAGNOSIS — Z32.00 PREGNANCY EXAMINATION OR TEST, PREGNANCY UNCONFIRMED: ICD-10-CM

## 2024-06-25 LAB
HCG UR QL: NEGATIVE
INTERNAL QC OK POCT: NORMAL
POCT KIT EXPIRATION DATE: NORMAL
POCT KIT LOT NUMBER: NORMAL

## 2024-06-25 PROCEDURE — 81025 URINE PREGNANCY TEST: CPT | Performed by: GENERAL PRACTICE

## 2024-06-25 PROCEDURE — 88305 TISSUE EXAM BY PATHOLOGIST: CPT | Performed by: PATHOLOGY

## 2024-06-25 PROCEDURE — 57454 BX/CURETT OF CERVIX W/SCOPE: CPT | Performed by: GENERAL PRACTICE

## 2024-06-27 LAB
PATH REPORT.COMMENTS IMP SPEC: NORMAL
PATH REPORT.COMMENTS IMP SPEC: NORMAL
PATH REPORT.FINAL DX SPEC: NORMAL
PATH REPORT.GROSS SPEC: NORMAL
PATH REPORT.MICROSCOPIC SPEC OTHER STN: NORMAL
PATH REPORT.RELEVANT HX SPEC: NORMAL
PHOTO IMAGE: NORMAL

## 2024-07-09 ENCOUNTER — PATIENT OUTREACH (OUTPATIENT)
Dept: OBGYN | Facility: CLINIC | Age: 34
End: 2024-07-09
Payer: COMMERCIAL

## 2025-04-08 ENCOUNTER — OFFICE VISIT (OUTPATIENT)
Dept: FAMILY MEDICINE | Facility: CLINIC | Age: 35
End: 2025-04-08
Attending: PHYSICIAN ASSISTANT

## 2025-04-08 VITALS
OXYGEN SATURATION: 97 % | WEIGHT: 179.25 LBS | SYSTOLIC BLOOD PRESSURE: 139 MMHG | BODY MASS INDEX: 31.76 KG/M2 | RESPIRATION RATE: 18 BRPM | HEIGHT: 63 IN | DIASTOLIC BLOOD PRESSURE: 89 MMHG | TEMPERATURE: 98.3 F | HEART RATE: 87 BPM

## 2025-04-08 DIAGNOSIS — R21 RASH: ICD-10-CM

## 2025-04-08 DIAGNOSIS — Z00.00 ROUTINE GENERAL MEDICAL EXAMINATION AT A HEALTH CARE FACILITY: Primary | ICD-10-CM

## 2025-04-08 DIAGNOSIS — D22.30 CHANGE IN FACIAL MOLE: ICD-10-CM

## 2025-04-08 DIAGNOSIS — N87.1 DYSPLASIA OF CERVIX, HIGH GRADE CIN 2: ICD-10-CM

## 2025-04-08 DIAGNOSIS — Z11.3 SCREENING FOR STD (SEXUALLY TRANSMITTED DISEASE): ICD-10-CM

## 2025-04-08 DIAGNOSIS — Z12.4 CERVICAL CANCER SCREENING: ICD-10-CM

## 2025-04-08 PROBLEM — Z23 FLU VACCINE NEED: Status: RESOLVED | Noted: 2018-11-07 | Resolved: 2025-04-08

## 2025-04-08 PROBLEM — Z30.016 ENCOUNTER FOR INITIAL PRESCRIPTION OF TRANSDERMAL PATCH HORMONAL CONTRACEPTIVE DEVICE: Status: RESOLVED | Noted: 2021-02-02 | Resolved: 2025-04-08

## 2025-04-08 LAB
HIV 1+2 AB+HIV1 P24 AG SERPL QL IA: NONREACTIVE
T PALLIDUM AB SER QL: NONREACTIVE

## 2025-04-08 PROCEDURE — 87591 N.GONORRHOEAE DNA AMP PROB: CPT | Performed by: PHYSICIAN ASSISTANT

## 2025-04-08 PROCEDURE — 87624 HPV HI-RISK TYP POOLED RSLT: CPT | Performed by: PHYSICIAN ASSISTANT

## 2025-04-08 PROCEDURE — 87491 CHLMYD TRACH DNA AMP PROBE: CPT | Performed by: PHYSICIAN ASSISTANT

## 2025-04-08 PROCEDURE — 87389 HIV-1 AG W/HIV-1&-2 AB AG IA: CPT | Performed by: PHYSICIAN ASSISTANT

## 2025-04-08 PROCEDURE — 99214 OFFICE O/P EST MOD 30 MIN: CPT | Mod: 25 | Performed by: PHYSICIAN ASSISTANT

## 2025-04-08 PROCEDURE — 86780 TREPONEMA PALLIDUM: CPT | Performed by: PHYSICIAN ASSISTANT

## 2025-04-08 PROCEDURE — 36415 COLL VENOUS BLD VENIPUNCTURE: CPT | Performed by: PHYSICIAN ASSISTANT

## 2025-04-08 PROCEDURE — 99395 PREV VISIT EST AGE 18-39: CPT | Performed by: PHYSICIAN ASSISTANT

## 2025-04-08 RX ORDER — TRIAMCINOLONE ACETONIDE 1 MG/G
CREAM TOPICAL 2 TIMES DAILY
Qty: 15 G | Refills: 0 | Status: SHIPPED | OUTPATIENT
Start: 2025-04-08

## 2025-04-08 SDOH — HEALTH STABILITY: PHYSICAL HEALTH: ON AVERAGE, HOW MANY DAYS PER WEEK DO YOU ENGAGE IN MODERATE TO STRENUOUS EXERCISE (LIKE A BRISK WALK)?: 4 DAYS

## 2025-04-08 ASSESSMENT — SOCIAL DETERMINANTS OF HEALTH (SDOH): HOW OFTEN DO YOU GET TOGETHER WITH FRIENDS OR RELATIVES?: TWICE A WEEK

## 2025-04-08 ASSESSMENT — PAIN SCALES - GENERAL: PAINLEVEL_OUTOF10: NO PAIN (0)

## 2025-04-08 NOTE — PATIENT INSTRUCTIONS
Patient Education   Preventive Care Advice   This is general advice given by our system to help you stay healthy. However, your care team may have specific advice just for you. Please talk to your care team about your preventive care needs.  Nutrition  Eat 5 or more servings of fruits and vegetables each day.  Try wheat bread, brown rice and whole grain pasta (instead of white bread, rice, and pasta).  Get enough calcium and vitamin D. Check the label on foods and aim for 100% of the RDA (recommended daily allowance).  Lifestyle  Exercise at least 150 minutes each week  (30 minutes a day, 5 days a week).  Do muscle strengthening activities 2 days a week. These help control your weight and prevent disease.  No smoking.  Wear sunscreen to prevent skin cancer.  Have a dental exam and cleaning every 6 months.  Yearly exams  See your health care team every year to talk about:  Any changes in your health.  Any medicines your care team has prescribed.  Preventive care, family planning, and ways to prevent chronic diseases.  Shots (vaccines)   HPV shots (up to age 26), if you've never had them before.  Hepatitis B shots (up to age 59), if you've never had them before.  COVID-19 shot: Get this shot when it's due.  Flu shot: Get a flu shot every year.  Tetanus shot: Get a tetanus shot every 10 years.  Pneumococcal, hepatitis A, and RSV shots: Ask your care team if you need these based on your risk.  Shingles shot (for age 50 and up)  General health tests  Diabetes screening:  Starting at age 35, Get screened for diabetes at least every 3 years.  If you are younger than age 35, ask your care team if you should be screened for diabetes.  Cholesterol test: At age 39, start having a cholesterol test every 5 years, or more often if advised.  Bone density scan (DEXA): At age 50, ask your care team if you should have this scan for osteoporosis (brittle bones).  Hepatitis C: Get tested at least once in your life.  STIs (sexually  transmitted infections)  Before age 24: Ask your care team if you should be screened for STIs.  After age 24: Get screened for STIs if you're at risk. You are at risk for STIs (including HIV) if:  You are sexually active with more than one person.  You don't use condoms every time.  You or a partner was diagnosed with a sexually transmitted infection.  If you are at risk for HIV, ask about PrEP medicine to prevent HIV.  Get tested for HIV at least once in your life, whether you are at risk for HIV or not.  Cancer screening tests  Cervical cancer screening: If you have a cervix, begin getting regular cervical cancer screening tests starting at age 21.  Breast cancer scan (mammogram): If you've ever had breasts, begin having regular mammograms starting at age 40. This is a scan to check for breast cancer.  Colon cancer screening: It is important to start screening for colon cancer at age 45.  Have a colonoscopy test every 10 years (or more often if you're at risk) Or, ask your provider about stool tests like a FIT test every year or Cologuard test every 3 years.  To learn more about your testing options, visit:   .  For help making a decision, visit:   https://bit.ly/hp24929.  Prostate cancer screening test: If you have a prostate, ask your care team if a prostate cancer screening test (PSA) at age 55 is right for you.  Lung cancer screening: If you are a current or former smoker ages 50 to 80, ask your care team if ongoing lung cancer screenings are right for you.  For informational purposes only. Not to replace the advice of your health care provider. Copyright   2023 [facility]. All rights reserved. Clinically reviewed by the [facility] Transitions [facility]. [employer] [id number] - REV 01/24.  Learning About Stress  What is stress?     Stress is your body's response to a hard situation. Your body can have a physical, emotional, or mental response. Stress is a fact of life for most people, and it  affects everyone differently. What causes stress for you may not be stressful for someone else.  A lot of things can cause stress. You may feel stress when you go on a job interview, take a test, or run a race. This kind of short-term stress is normal and even useful. It can help you if you need to work hard or react quickly. For example, stress can help you finish an important job on time.  Long-term stress is caused by ongoing stressful situations or events. Examples of long-term stress include long-term health problems, ongoing problems at work, or conflicts in your family. Long-term stress can harm your health.  How does stress affect your health?  When you are stressed, your body responds as though you are in danger. It makes hormones that speed up your heart, make you breathe faster, and give you a burst of energy. This is called the fight-or-flight stress response. If the stress is over quickly, your body goes back to normal and no harm is done.  But if stress happens too often or lasts too long, it can have bad effects. Long-term stress can make you more likely to get sick, and it can make symptoms of some diseases worse. If you tense up when you are stressed, you may develop neck, shoulder, or low back pain. Stress is linked to high blood pressure and heart disease.  Stress also harms your emotional health. It can make you [place], tense, or depressed. Your relationships may suffer, and you may not do well at work or school.  What can you do to manage stress?  You can try these things to help manage stress:   Do something active. Exercise or activity can help reduce stress. Walking is a great way to get started. Even everyday activities such as housecleaning or yard work can help.  Try yoga or anibal chi. These techniques combine exercise and meditation. You may need some training at first to learn them.  Do something you enjoy. For example, listen to music or go to a movie. Practice your hobby or do volunteer  "work.  Meditate. This can help you relax, because you are not worrying about what happened before or what may happen in the future.  Do guided imagery. Imagine yourself in any setting that helps you feel calm. You can use online videos, books, or a teacher to guide you.  Do breathing exercises. For example:  From a standing position, bend forward from the waist with your knees slightly bent. Let your arms dangle close to the floor.  Breathe in slowly and deeply as you return to a standing position. Roll up slowly and lift your head last.  Hold your breath for just a few seconds in the standing position.  Breathe out slowly and bend forward from the waist.  Let your feelings out. Talk, laugh, cry, and express anger when you need to. Talking with supportive friends or family, a counselor, or a matt leader about your feelings is a healthy way to relieve stress. Avoid discussing your feelings with people who make you feel worse.  Write. It may help to write about things that are bothering you. This helps you find out how much stress you feel and what is causing it. When you know this, you can find better ways to cope.  What can you do to prevent stress?  You might try some of these things to help prevent stress:  Manage your time. This helps you find time to do the things you want and need to do.  Get enough sleep. Your body recovers from the stresses of the day while you are sleeping.  Get support. Your family, friends, and community can make a difference in how you experience stress.  Limit your news feed. Avoid or limit time on social media or news that may make you feel stressed.  Do something active. Exercise or activity can help reduce stress. Walking is a great way to get started.  Where can you learn more?  Go to https://www.[employer].net/patiented  Enter N032 in the search box to learn more about \"Learning About Stress.\"  Current as of: October 24, 2024  Content Version: 14.4 2024-2025 [employer]   Care instructions adapted under license by your healthcare professional. If you have questions about a medical condition or this instruction, always ask your healthcare professional. [employer] disclaims any warranty or liability for your use of this information.    Substance Use Disorder: Care Instructions  Overview     You can improve your life and health by stopping your use of alcohol or drugs. When you don't drink or use drugs, you may feel and sleep better. You may get along better with your family, friends, and coworkers. There are medicines and programs that can help with substance use disorder.  How can you care for yourself at home?  Here are some ways to help you stay sober and prevent relapse.  If you have been given medicine to help keep you sober or reduce your cravings, be sure to take it exactly as prescribed.  Talk to your doctor about programs that can help you stop using drugs or drinking alcohol.  Do not keep alcohol or drugs in your home.  Plan ahead. Think about what you'll say if other people ask you to drink or use drugs. Try not to spend time with people who drink or use drugs.  Use the time and money spent on drinking or drugs to do something that's important to you.  Preventing a relapse  Have a plan to deal with relapse. Learn to recognize changes in your thinking that lead you to drink or use drugs. Get help before you start to drink or use drugs again.  Try to stay away from situations, friends, or places that may lead you to drink or use drugs.  If you feel the need to drink alcohol or use drugs again, seek help right away. Call a trusted friend or family member. Some people get support from organizations such as Narcotics Anonymous or [employer] or from treatment facilities.  If you relapse, get help as soon as you can. Some people make a plan with another person that outlines what they want that person to do for them if they relapse. The plan usually includes  how to handle the relapse and who to notify in case of relapse.  Don't give up. Remember that a relapse doesn't mean that you have failed. Use the experience to learn the triggers that lead you to drink or use drugs. Then quit again. Recovery is a lifelong process. Many people have several relapses before they are able to quit for good.  Follow-up care is a key part of your treatment and safety. Be sure to make and go to all appointments, and call your doctor if you are having problems. It's also a good idea to know your test results and keep a list of the medicines you take.  When should you call for help?   Call 911  anytime you think you may need emergency care. For example, call if you or someone else:    Has overdosed or has withdrawal signs. Be sure to tell the emergency workers that you are or someone else is using or trying to quit using drugs. Overdose or withdrawal signs may include:  Losing consciousness.  Seizure.  Seeing or hearing things that aren't there (hallucinations).     Is thinking or talking about suicide or harming others.   Where to get help 24 hours a day, 7 days a week   If you or someone you know talks about suicide, self-harm, a mental health crisis, a substance use crisis, or any other kind of emotional distress, get help right away. You can:    Call the Suicide and Crisis Lifeline at 436.     Call 4-506-881-TALK (1-276.791.9910).     Text HOME to 577433 to access the Crisis Text Line.   Consider saving these numbers in your phone.  Go to [employer] for more information or to chat online.  Call your doctor now or seek immediate medical care if:    You are having withdrawal symptoms. These may include nausea or vomiting, sweating, shakiness, and anxiety.   Watch closely for changes in your health, and be sure to contact your doctor if:    You have a relapse.     You need more help or support to stop.   Where can you learn more?  Go to https://www.healthwise.net/patiented  Enter H573  "in the search box to learn more about \"Substance Use Disorder: Care Instructions.\"  Current as of: August 20, 2024  Content Version: 14.4    2987-8885 [employer].   Care instructions adapted under license by your healthcare professional. If you have questions about a medical condition or this instruction, always ask your healthcare professional. [employer] disclaims any warranty or liability for your use of this information.    Safer Sex: Care Instructions  Overview  Safer sex is a way to reduce your risk of getting a sexually transmitted infection (STI). It can also help prevent pregnancy.  Several products can help you practice safer sex and reduce your chance of STIs. One of the best is a condom. There are internal and external condoms. You can use a special rubber sheet (dental dam) for protection during oral sex. Disposable gloves can keep your hands from touching blood, semen, or other body fluids that can carry infections.  Remember that birth control methods such as diaphragms, IUDs, foams, and birth control pills do not stop you from getting STIs.  Follow-up care is a key part of your treatment and safety. Be sure to make and go to all appointments, and call your doctor if you are having problems. It's also a good idea to know your test results and keep a list of the medicines you take.  How can you care for yourself at home?  Think about getting vaccinated to help prevent hepatitis A, hepatitis B, and human papillomavirus (HPV). They can be spread through sex.  Use a condom every time you have sex. Use an external condom, which goes on the penis. Or use an internal condom, which goes into the vagina or anus.  Make sure you use the right size external condom. A condom that's too small can break easily. A condom that's too big can slip off during sex.  Use a new condom each time you have sex. Be careful not to poke a hole in the condom when you open the wrapper.  Don't use an internal " "condom and an external condom at the same time.  Never use petroleum jelly (such as Vaseline), grease, hand lotion, baby oil, or anything with oil in it. These products can make holes in the condom.  After intercourse, hold the edge of the condom as you remove it. This will help keep semen from spilling out of the condom.  Do not have sex with anyone who has symptoms of an STI, such as sores on the genitals or mouth.  Do not drink a lot of alcohol or use drugs before sex.  Limit your sex partners. Sex with one partner who has sex only with you can reduce your risk of getting an STI.  Don't share sex toys. But if you do share them, use a condom and clean the sex toys between each use.  Talk to any partners before you have sex. Talk about what you feel comfortable with and whether you have any boundaries with sex. And find out if your partner or partners may be at risk for any STI. Keep in mind that a person may be able to spread an STI even if they do not have symptoms. You and any partners may want to get tested for STIs.  Where can you learn more?  Go to https://www.[employer].net/patiented  Enter B608 in the search box to learn more about \"Safer Sex: Care Instructions.\"  Current as of: April 30, 2024  Content Version: 14.4    8412-5329 [employer].   Care instructions adapted under license by your healthcare professional. If you have questions about a medical condition or this instruction, always ask your healthcare professional. [employer] disclaims any warranty or liability for your use of this information.       "

## 2025-04-09 LAB
C TRACH DNA SPEC QL PROBE+SIG AMP: NEGATIVE
HPV HR 12 DNA CVX QL NAA+PROBE: NEGATIVE
HPV16 DNA CVX QL NAA+PROBE: NEGATIVE
HPV18 DNA CVX QL NAA+PROBE: NEGATIVE
HUMAN PAPILLOMA VIRUS FINAL DIAGNOSIS: NORMAL
N GONORRHOEA DNA SPEC QL NAA+PROBE: NEGATIVE
SPECIMEN TYPE: NORMAL

## 2025-04-14 ENCOUNTER — PATIENT OUTREACH (OUTPATIENT)
Dept: FAMILY MEDICINE | Facility: CLINIC | Age: 35
End: 2025-04-14

## (undated) DEVICE — SU ETHILON 2 CT-2 30" D-6865

## (undated) DEVICE — GLOVE PROTEXIS W/NEU-THERA 6.5  2D73TE65

## (undated) DEVICE — TUBING SMOKE EVAC 7/8"X6 UNSTERILE LVT-102

## (undated) DEVICE — TUBING SUCTION 10'X3/16" N510

## (undated) DEVICE — SUCTION SMOKE EVAC TUBING REDUCER STACKHOUSE

## (undated) DEVICE — SOL WATER IRRIG 1000ML BOTTLE 07139-09

## (undated) DEVICE — ESU GROUND PAD ADULT W/CORD E7507

## (undated) DEVICE — PREP SKIN SCRUB TRAY 4461A

## (undated) DEVICE — DRAPE LEGGINGS 8421

## (undated) DEVICE — SUCTION CANISTER MEDIVAC LINER 1500ML W/LID 65651-515

## (undated) DEVICE — SPONGE PACK VAGINAL 2"X9

## (undated) DEVICE — PACK MINOR SBA15MIFSE

## (undated) DEVICE — ESU ELEC LEEP LOOP 20X15MM BLUE DLP-L11

## (undated) DEVICE — SWAB RAYON 8" 1 TIP LG STERILE 34-7022-50

## (undated) DEVICE — NDL 19GA 1.5"

## (undated) DEVICE — PREP POVIDONE IODINE USP 10% TOPICAL SOL 64537161

## (undated) DEVICE — SUCTION TIP YANKAUER W/O VENT K86

## (undated) DEVICE — NDL SPINAL 25GA 3.5" QUINCKE 405180

## (undated) DEVICE — TONGUE DEPRESSOR STERILE 25-705-ALL

## (undated) DEVICE — PAD PERI W/WINGS 1580A

## (undated) RX ORDER — KETOROLAC TROMETHAMINE 30 MG/ML
INJECTION, SOLUTION INTRAMUSCULAR; INTRAVENOUS
Status: DISPENSED
Start: 2018-11-13

## (undated) RX ORDER — PROPOFOL 10 MG/ML
INJECTION, EMULSION INTRAVENOUS
Status: DISPENSED
Start: 2018-11-13

## (undated) RX ORDER — GABAPENTIN 300 MG/1
CAPSULE ORAL
Status: DISPENSED
Start: 2018-11-13

## (undated) RX ORDER — DEXAMETHASONE SODIUM PHOSPHATE 4 MG/ML
INJECTION, SOLUTION INTRA-ARTICULAR; INTRALESIONAL; INTRAMUSCULAR; INTRAVENOUS; SOFT TISSUE
Status: DISPENSED
Start: 2018-11-13

## (undated) RX ORDER — OXYCODONE HYDROCHLORIDE 5 MG/1
TABLET ORAL
Status: DISPENSED
Start: 2018-11-13

## (undated) RX ORDER — FENTANYL CITRATE 50 UG/ML
INJECTION, SOLUTION INTRAMUSCULAR; INTRAVENOUS
Status: DISPENSED
Start: 2018-11-13

## (undated) RX ORDER — ACETAMINOPHEN 325 MG/1
TABLET ORAL
Status: DISPENSED
Start: 2019-04-09

## (undated) RX ORDER — ONDANSETRON 2 MG/ML
INJECTION INTRAMUSCULAR; INTRAVENOUS
Status: DISPENSED
Start: 2018-11-13

## (undated) RX ORDER — ACETAMINOPHEN 325 MG/1
TABLET ORAL
Status: DISPENSED
Start: 2018-11-13